# Patient Record
Sex: FEMALE | Race: WHITE | NOT HISPANIC OR LATINO | Employment: FULL TIME | URBAN - METROPOLITAN AREA
[De-identification: names, ages, dates, MRNs, and addresses within clinical notes are randomized per-mention and may not be internally consistent; named-entity substitution may affect disease eponyms.]

---

## 2017-10-11 ENCOUNTER — OFFICE VISIT (OUTPATIENT)
Dept: FAMILY MEDICINE CLINIC | Facility: CLINIC | Age: 22
End: 2017-10-11

## 2017-10-11 VITALS
SYSTOLIC BLOOD PRESSURE: 122 MMHG | HEART RATE: 83 BPM | DIASTOLIC BLOOD PRESSURE: 84 MMHG | BODY MASS INDEX: 36.8 KG/M2 | WEIGHT: 200 LBS | OXYGEN SATURATION: 100 % | HEIGHT: 62 IN

## 2017-10-11 DIAGNOSIS — Z23 NEED FOR VACCINATION: ICD-10-CM

## 2017-10-11 DIAGNOSIS — R19.7 DIARRHEA IN ADULT PATIENT: ICD-10-CM

## 2017-10-11 DIAGNOSIS — Z00.00 ANNUAL PHYSICAL EXAM: Primary | ICD-10-CM

## 2017-10-11 DIAGNOSIS — R56.9 SEIZURES (HCC): ICD-10-CM

## 2017-10-11 DIAGNOSIS — D50.8 OTHER IRON DEFICIENCY ANEMIA: ICD-10-CM

## 2017-10-11 DIAGNOSIS — Z00.00 PHYSICAL EXAM: ICD-10-CM

## 2017-10-11 LAB
ALBUMIN SERPL-MCNC: 4.5 G/DL (ref 3.5–5.2)
ALBUMIN/GLOB SERPL: 1.6 G/DL
ALP SERPL-CCNC: 57 U/L (ref 39–117)
ALT SERPL-CCNC: 22 U/L (ref 1–33)
AST SERPL-CCNC: 16 U/L (ref 1–32)
BASOPHILS # BLD AUTO: 0.01 10*3/MM3 (ref 0–0.2)
BASOPHILS NFR BLD AUTO: 0.1 % (ref 0–1.5)
BILIRUB SERPL-MCNC: 0.2 MG/DL (ref 0.1–1.2)
BUN SERPL-MCNC: 9 MG/DL (ref 6–20)
BUN/CREAT SERPL: 12.3 (ref 7–25)
CALCIUM SERPL-MCNC: 9.6 MG/DL (ref 8.6–10.5)
CHLORIDE SERPL-SCNC: 106 MMOL/L (ref 98–107)
CHOLEST SERPL-MCNC: 169 MG/DL (ref 0–200)
CHOLEST/HDLC SERPL: 3.25 {RATIO}
CO2 SERPL-SCNC: 21.9 MMOL/L (ref 22–29)
CREAT SERPL-MCNC: 0.73 MG/DL (ref 0.57–1)
DIFFERENTIAL COMMENT: NORMAL
EOSINOPHIL # BLD AUTO: 0.08 10*3/MM3 (ref 0–0.7)
EOSINOPHIL NFR BLD AUTO: 1.1 % (ref 0.3–6.2)
ERYTHROCYTE [DISTWIDTH] IN BLOOD BY AUTOMATED COUNT: 15.2 % (ref 11.7–13)
GLOBULIN SER CALC-MCNC: 2.8 GM/DL
GLUCOSE SERPL-MCNC: 87 MG/DL (ref 65–99)
HCT VFR BLD AUTO: 36.6 % (ref 35.6–45.5)
HDLC SERPL-MCNC: 52 MG/DL (ref 40–60)
HGB BLD-MCNC: 11.8 G/DL (ref 11.9–15.5)
IMM GRANULOCYTES # BLD: 0 10*3/MM3 (ref 0–0.03)
IMM GRANULOCYTES NFR BLD: 0 % (ref 0–0.5)
LDLC SERPL CALC-MCNC: 102 MG/DL (ref 0–100)
LYMPHOCYTES # BLD AUTO: 2.03 10*3/MM3 (ref 0.9–4.8)
LYMPHOCYTES NFR BLD AUTO: 28.6 % (ref 19.6–45.3)
MCH RBC QN AUTO: 22.8 PG (ref 26.9–32)
MCHC RBC AUTO-ENTMCNC: 32.2 G/DL (ref 32.4–36.3)
MCV RBC AUTO: 70.8 FL (ref 80.5–98.2)
MONOCYTES # BLD AUTO: 0.46 10*3/MM3 (ref 0.2–1.2)
MONOCYTES NFR BLD AUTO: 6.5 % (ref 5–12)
NEUTROPHILS # BLD AUTO: 4.52 10*3/MM3 (ref 1.9–8.1)
NEUTROPHILS NFR BLD AUTO: 63.7 % (ref 42.7–76)
PLATELET # BLD AUTO: 294 10*3/MM3 (ref 140–500)
PLATELET BLD QL SMEAR: NORMAL
POTASSIUM SERPL-SCNC: 4.1 MMOL/L (ref 3.5–5.2)
PROT SERPL-MCNC: 7.3 G/DL (ref 6–8.5)
RBC # BLD AUTO: 5.17 10*6/MM3 (ref 3.9–5.2)
RBC MORPH BLD: NORMAL
SODIUM SERPL-SCNC: 142 MMOL/L (ref 136–145)
TRIGL SERPL-MCNC: 77 MG/DL (ref 0–150)
VLDLC SERPL CALC-MCNC: 15.4 MG/DL (ref 5–40)
WBC # BLD AUTO: 7.1 10*3/MM3 (ref 4.5–10.7)

## 2017-10-11 PROCEDURE — 90471 IMMUNIZATION ADMIN: CPT | Performed by: NURSE PRACTITIONER

## 2017-10-11 PROCEDURE — 90686 IIV4 VACC NO PRSV 0.5 ML IM: CPT | Performed by: NURSE PRACTITIONER

## 2017-10-11 PROCEDURE — 99213 OFFICE O/P EST LOW 20 MIN: CPT | Performed by: NURSE PRACTITIONER

## 2017-10-11 PROCEDURE — 99395 PREV VISIT EST AGE 18-39: CPT | Performed by: NURSE PRACTITIONER

## 2017-10-11 RX ORDER — TOPIRAMATE 50 MG/1
50 TABLET, FILM COATED ORAL NIGHTLY
Qty: 30 TABLET | Refills: 11 | Status: SHIPPED | OUTPATIENT
Start: 2017-10-11 | End: 2017-12-05 | Stop reason: SDUPTHER

## 2017-10-11 NOTE — PATIENT INSTRUCTIONS
Preventive Care 18-39 Years, Female  Preventive care refers to lifestyle choices and visits with your health care provider that can promote health and wellness.  WHAT DOES PREVENTIVE CARE INCLUDE?  · A yearly physical exam. This is also called an annual well check.  · Dental exams once or twice a year.  · Routine eye exams. Ask your health care provider how often you should have your eyes checked.  · Personal lifestyle choices, including:    Daily care of your teeth and gums.    Regular physical activity.    Eating a healthy diet.    Avoiding tobacco and drug use.    Limiting alcohol use.    Practicing safe sex.    Taking vitamin and mineral supplements as recommended by your health care provider.  WHAT HAPPENS DURING AN ANNUAL WELL CHECK?  The services and screenings done by your health care provider during your annual well check will depend on your age, overall health, lifestyle risk factors, and family history of disease.  Counseling  Your health care provider may ask you questions about your:  · Alcohol use.  · Tobacco use.  · Drug use.  · Emotional well-being.  · Home and relationship well-being.  · Sexual activity.  · Eating habits.  · Work and work environment.  · Method of birth control.  · Menstrual cycle.  · Pregnancy history.  Screening  You may have the following tests or measurements:  · Height, weight, and BMI.  · Diabetes screening. This is done by checking your blood sugar (glucose) after you have not eaten for a while (fasting).  · Blood pressure.  · Lipid and cholesterol levels. These may be checked every 5 years starting at age 20.  · Skin check.  · Hepatitis C blood test.  · Hepatitis B blood test.  · Sexually transmitted disease (STD) testing.  · BRCA-related cancer screening. This may be done if you have a family history of breast, ovarian, tubal, or peritoneal cancers.  · Pelvic exam and Pap test. This may be done every 3 years starting at age 21. Starting at age 30, this may be done every 5  years if you have a Pap test in combination with an HPV test.  Discuss your test results, treatment options, and if necessary, the need for more tests with your health care provider.  Vaccines  Your health care provider may recommend certain vaccines, such as:  · Influenza vaccine. This is recommended every year.  · Tetanus, diphtheria, and acellular pertussis (Tdap, Td) vaccine. You may need a Td booster every 10 years.  · Varicella vaccine. You may need this if you have not been vaccinated.  · HPV vaccine. If you are 26 or younger, you may need three doses over 6 months.  · Measles, mumps, and rubella (MMR) vaccine. You may need at least one dose of MMR. You may also need a second dose.  · Pneumococcal 13-valent conjugate (PCV13) vaccine. You may need this if you have certain conditions and were not previously vaccinated.  · Pneumococcal polysaccharide (PPSV23) vaccine. You may need one or two doses if you smoke cigarettes or if you have certain conditions.  · Meningococcal vaccine. One dose is recommended if you are age 19-21 years and a first-year college student living in a residence yeager, or if you have one of several medical conditions. You may also need additional booster doses.  · Hepatitis A vaccine. You may need this if you have certain conditions or if you travel or work in places where you may be exposed to hepatitis A.  · Hepatitis B vaccine. You may need this if you have certain conditions or if you travel or work in places where you may be exposed to hepatitis B.  · Haemophilus influenzae type b (Hib) vaccine. You may need this if you have certain risk factors.  Talk to your health care provider about which screenings and vaccines you need and how often you need them.     This information is not intended to replace advice given to you by your health care provider. Make sure you discuss any questions you have with your health care provider.     Document Released: 02/13/2003 Document Revised:  04/10/2017 Document Reviewed: 10/18/2016  Elsevier Interactive Patient Education ©2017 [employer] Inc.

## 2017-10-11 NOTE — PROGRESS NOTES
Tangela Rodarte is a 22 y.o. female.  Seen 10/11/2017    Assessment/Plan   Problem List Items Addressed This Visit     None      Visit Diagnoses     Annual physical exam    -  Primary    Relevant Orders    Comprehensive Metabolic Panel    Lipid Panel With / Chol / HDL Ratio    Need for vaccination        Relevant Orders    Flu Vaccine Quad PF 3YR+ (FLUARIX 0814-3028)    Seizures        Relevant Medications    topiramate (TOPAMAX) 50 MG tablet    Other iron deficiency anemia        Relevant Orders    CBC Auto Differential    Physical exam        Diarrhea in adult patient        Relevant Orders    Ambulatory Referral to Gastroenterology             No Follow-up on file.  Patient Instructions   Preventive Care 18-39 Years, Female  Preventive care refers to lifestyle choices and visits with your health care provider that can promote health and wellness.  WHAT DOES PREVENTIVE CARE INCLUDE?  · A yearly physical exam. This is also called an annual well check.  · Dental exams once or twice a year.  · Routine eye exams. Ask your health care provider how often you should have your eyes checked.  · Personal lifestyle choices, including:    Daily care of your teeth and gums.    Regular physical activity.    Eating a healthy diet.    Avoiding tobacco and drug use.    Limiting alcohol use.    Practicing safe sex.    Taking vitamin and mineral supplements as recommended by your health care provider.  WHAT HAPPENS DURING AN ANNUAL WELL CHECK?  The services and screenings done by your health care provider during your annual well check will depend on your age, overall health, lifestyle risk factors, and family history of disease.  Counseling  Your health care provider may ask you questions about your:  · Alcohol use.  · Tobacco use.  · Drug use.  · Emotional well-being.  · Home and relationship well-being.  · Sexual activity.  · Eating habits.  · Work and work environment.  · Method of birth control.  · Menstrual cycle.  · Pregnancy  history.  Screening  You may have the following tests or measurements:  · Height, weight, and BMI.  · Diabetes screening. This is done by checking your blood sugar (glucose) after you have not eaten for a while (fasting).  · Blood pressure.  · Lipid and cholesterol levels. These may be checked every 5 years starting at age 20.  · Skin check.  · Hepatitis C blood test.  · Hepatitis B blood test.  · Sexually transmitted disease (STD) testing.  · BRCA-related cancer screening. This may be done if you have a family history of breast, ovarian, tubal, or peritoneal cancers.  · Pelvic exam and Pap test. This may be done every 3 years starting at age 21. Starting at age 30, this may be done every 5 years if you have a Pap test in combination with an HPV test.  Discuss your test results, treatment options, and if necessary, the need for more tests with your health care provider.  Vaccines  Your health care provider may recommend certain vaccines, such as:  · Influenza vaccine. This is recommended every year.  · Tetanus, diphtheria, and acellular pertussis (Tdap, Td) vaccine. You may need a Td booster every 10 years.  · Varicella vaccine. You may need this if you have not been vaccinated.  · HPV vaccine. If you are 26 or younger, you may need three doses over 6 months.  · Measles, mumps, and rubella (MMR) vaccine. You may need at least one dose of MMR. You may also need a second dose.  · Pneumococcal 13-valent conjugate (PCV13) vaccine. You may need this if you have certain conditions and were not previously vaccinated.  · Pneumococcal polysaccharide (PPSV23) vaccine. You may need one or two doses if you smoke cigarettes or if you have certain conditions.  · Meningococcal vaccine. One dose is recommended if you are age 19-21 years and a first-year college student living in a residence yeager, or if you have one of several medical conditions. You may also need additional booster doses.  · Hepatitis A vaccine. You may need this  if you have certain conditions or if you travel or work in places where you may be exposed to hepatitis A.  · Hepatitis B vaccine. You may need this if you have certain conditions or if you travel or work in places where you may be exposed to hepatitis B.  · Haemophilus influenzae type b (Hib) vaccine. You may need this if you have certain risk factors.  Talk to your health care provider about which screenings and vaccines you need and how often you need them.     This information is not intended to replace advice given to you by your health care provider. Make sure you discuss any questions you have with your health care provider.     Document Released: 02/13/2003 Document Revised: 04/10/2017 Document Reviewed: 10/18/2016  [employer] Interactive Patient Education ©2017 [employer]        Subjective   Here for her annual exam.  [facility].  H/O seborrhea and psoriasis and is followed by derm and stable on meds  H/o seizures and is followed by neurology and stable on her topimax.  H/O iron deficiency anemia and takes an iron supplement.  Has had some GI issues that started to get bad about 6 months ago. Has episodes where she eats and then will immediately have some diarrhea.  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 degree in economics  Chief Complaint   Patient presents with   • Annual Exam     Social History   Substance Use Topics   • Smoking status: Never Smoker   • Smokeless tobacco: Never Used   • Alcohol use Yes      Comment: SOCIAL       History of Present Illness     The following portions of the patient's history were reviewed and updated as appropriate:PMHroutine: Social history , Past Medical History, Surgical history , Allergies, Current Medications, Active Problem List, Family History and Health Maintenance    Review of Systems   Constitutional: Negative for activity change, appetite change, chills, fatigue, fever and unexpected weight change.   HENT: Negative for  "sore throat.    Respiratory: Negative for cough and wheezing.    Cardiovascular: Negative for leg swelling.   Gastrointestinal: Positive for diarrhea. Negative for constipation.   Skin: Negative for rash and wound.   Neurological: Negative for dizziness, seizures, weakness, numbness and headaches.   Psychiatric/Behavioral: Negative for dysphoric mood.       Objective   Vitals:    10/11/17 1313   BP: 122/84   Pulse: 83   SpO2: 100%   Weight: 200 lb (90.7 kg)   Height: 62\" (157.5 cm)     Body mass index is 36.58 kg/(m^2).  Physical Exam   Constitutional: She appears well-developed and well-nourished. No distress.   HENT:   Head: Normocephalic and atraumatic.   Right Ear: External ear normal.   Left Ear: External ear normal.   Eyes: EOM are normal.   Neck: Neck supple. No thyromegaly present.   Cardiovascular: Normal rate, regular rhythm and normal heart sounds.    Pulmonary/Chest: Effort normal and breath sounds normal.   Musculoskeletal: Normal range of motion.   Neurological: She is alert.   Skin: Skin is warm.   Thickened great toe nail to left foot   Nursing note and vitals reviewed.    Reviewed Data:  No visits with results within 1 Month(s) from this visit.  Latest known visit with results is:    Orders Only on 03/07/2016   Component Date Value Ref Range Status   • TIBC 03/11/2016 448  mcg/dL Final   • UIBC 03/11/2016 340  mcg/dL Final   • Iron 03/11/2016 108  37 - 145 mcg/dL Final   • Iron Saturation 03/11/2016 24  20 - 50 % Final   • Ferritin 03/11/2016 43.8  13.0 - 150.0 ng/mL Final         "

## 2017-11-02 RX ORDER — TOPIRAMATE 50 MG/1
TABLET, FILM COATED ORAL
Qty: 30 TABLET | Refills: 0 | Status: SHIPPED | OUTPATIENT
Start: 2017-11-02 | End: 2017-11-15

## 2017-11-15 ENCOUNTER — OFFICE VISIT (OUTPATIENT)
Dept: GASTROENTEROLOGY | Facility: CLINIC | Age: 22
End: 2017-11-15

## 2017-11-15 VITALS
SYSTOLIC BLOOD PRESSURE: 122 MMHG | WEIGHT: 199 LBS | HEIGHT: 62 IN | BODY MASS INDEX: 36.62 KG/M2 | DIASTOLIC BLOOD PRESSURE: 84 MMHG

## 2017-11-15 DIAGNOSIS — R19.7 DIARRHEA, UNSPECIFIED TYPE: Primary | ICD-10-CM

## 2017-11-15 PROCEDURE — 99203 OFFICE O/P NEW LOW 30 MIN: CPT | Performed by: INTERNAL MEDICINE

## 2017-11-15 NOTE — PROGRESS NOTES
"Chief Complaint   Patient presents with   • Diarrhea     off and on 6 mth- 1 year.   • Abdominal Pain     feels like gas pains, but never releases.        Tangela Rodarte is a  22 y.o. female here for an initial visit for Diarrhea    HPI This 22-year-old white female patient of Mable ZUNIGA presents with intermittent episodes of diarrhea.  She recounts problems dating back to middle school but has noted more frequent episodes over the past 6 months.  She may have several days of loose bowel movements described as 60-70% liquid and 30% solid.  These episodes may be followed by a normal formed bowel movement on a daily basis for up to 2 weeks at a time.  She notes symptoms are worse in the postprandial setting usually after her evening meal but not specific to the diet.  She does have a family history, a grandfather experienced gluten enteropathy.  Family history is negative for colorectal cancer.  Her symptoms are accompanied by a sense of urgency and lower abdominal cramping usually 30-60 minutes after her meal.  Usually this will stimulate her to have a bowel movement within 10 minutes of eating.  She has not had any nocturnal events.  Weight has decreased approximately 13 pounds but she notes this as volitional.  She's had rare reflux issues not requiring medication.  She does admit to \"mild anemia\" and has recently been initiated on iron therapy.  We discussed assessment to include stool studies, celiac panel, and possible endoscopic evaluation.    Past Medical History:   Diagnosis Date   • History of anemia as a child    • History of heart murmur in childhood    • Psoriasis    • Seborrhea    • Seizures        Current Outpatient Prescriptions   Medication Sig Dispense Refill   • clobetasol propionate (CLOBEX) 0.05 % shampoo      • Ferrous Sulfate (IRON) 28 MG tablet Take  by mouth.     • topiramate (TOPAMAX) 50 MG tablet Take 1 tablet by mouth Every Night. 30 tablet 11   • TRINESSA, 28, 0.18/0.215/0.25 " MG-35 MCG per tablet        No current facility-administered medications for this visit.        PRN Meds:.    No Known Allergies    Social History     Social History   • Marital status: Single     Spouse name: N/A   • Number of children: N/A   • Years of education: N/A     Occupational History   • Not on file.     Social History Main Topics   • Smoking status: Never Smoker   • Smokeless tobacco: Never Used   • Alcohol use Yes      Comment: SOCIAL   • Drug use: No   • Sexual activity: Not on file     Other Topics Concern   • Not on file     Social History Narrative       Family History   Problem Relation Age of Onset   • Diabetes Father    • Hypertension Father    • Cancer Father    • Kidney disease Father    • Diabetes Paternal Grandmother    • Arthritis Paternal Grandmother    • Stroke Paternal Grandfather    • Diabetes Paternal Grandfather        Review of Systems   Constitutional: Negative for activity change, appetite change, fatigue and unexpected weight change.   HENT: Negative for congestion, facial swelling, sore throat, trouble swallowing and voice change.    Eyes: Negative for photophobia and visual disturbance.   Respiratory: Negative for cough and choking.    Cardiovascular: Negative for chest pain.   Gastrointestinal: Positive for abdominal pain and diarrhea. Negative for abdominal distention, anal bleeding, blood in stool, constipation, nausea, rectal pain and vomiting.   Endocrine: Negative for polyphagia.   Musculoskeletal: Negative for arthralgias, gait problem and joint swelling.   Skin: Negative for color change, pallor and rash.   Allergic/Immunologic: Negative for food allergies.   Neurological: Negative for speech difficulty and headaches.   Hematological: Does not bruise/bleed easily.   Psychiatric/Behavioral: Negative for agitation, confusion and sleep disturbance.       Vitals:    11/15/17 1022   BP: 122/84       Physical Exam   Constitutional: She is oriented to person, place, and time. She  appears well-developed and well-nourished. No distress.   HENT:   Head: Normocephalic.   Mouth/Throat: Oropharynx is clear and moist. No oropharyngeal exudate.   Eyes: Conjunctivae and EOM are normal. No scleral icterus.   Neck: Normal range of motion. No thyromegaly present.   Cardiovascular: Normal rate and regular rhythm.    No murmur heard.  Pulmonary/Chest: Breath sounds normal. No respiratory distress. She has no wheezes. She has no rales.   Abdominal: Soft. Bowel sounds are normal. She exhibits no distension and no mass. There is no hepatosplenomegaly. There is no tenderness.   Musculoskeletal: Normal range of motion. She exhibits no edema or tenderness.   Lymphadenopathy:     She has no cervical adenopathy.   Neurological: She is alert and oriented to person, place, and time.   Skin: Skin is warm and dry. No rash noted. She is not diaphoretic. No erythema.   Psychiatric: She has a normal mood and affect. Her behavior is normal.   Vitals reviewed.      ASSESSMENT   #1 diarrhea: Chronic intermittent process.  Not clear as to specific association with diet, possible mild malabsorption component.      PLAN  Stool studies for fecal leukocytes and qualitative fecal fat  Celiac panel  Review of recent labs  Pending lab results we'll consider more formal evaluation  We'll call patient with results      ICD-10-CM ICD-9-CM   1. Diarrhea, unspecified type R19.7 787.91

## 2017-11-16 LAB
ENDOMYSIUM IGA SER QL: NEGATIVE
GLIADIN PEPTIDE IGA SER-ACNC: 4 UNITS (ref 0–19)
GLIADIN PEPTIDE IGG SER-ACNC: 5 UNITS (ref 0–19)
IGA SERPL-MCNC: 93 MG/DL (ref 87–352)
TTG IGA SER-ACNC: <2 U/ML (ref 0–3)
TTG IGG SER-ACNC: <2 U/ML (ref 0–5)

## 2017-11-20 LAB
FAT STL QL: NORMAL
NEUTRAL FAT STL QL: NORMAL
NTI FECAL TRPT (PP ORANGE): NORMAL
WBC STL QL MICRO: NORMAL
WBC STL QL MICRO: NORMAL

## 2017-12-05 DIAGNOSIS — R56.9 SEIZURES (HCC): ICD-10-CM

## 2017-12-05 RX ORDER — TOPIRAMATE 50 MG/1
50 TABLET, FILM COATED ORAL NIGHTLY
Qty: 30 TABLET | Refills: 11 | Status: SHIPPED | OUTPATIENT
Start: 2017-12-05 | End: 2018-03-02 | Stop reason: SDUPTHER

## 2017-12-05 RX ORDER — TOPIRAMATE 50 MG/1
TABLET, FILM COATED ORAL
Qty: 30 TABLET | Refills: 0 | OUTPATIENT
Start: 2017-12-05

## 2017-12-22 ENCOUNTER — TELEPHONE (OUTPATIENT)
Dept: GASTROENTEROLOGY | Facility: CLINIC | Age: 22
End: 2017-12-22

## 2017-12-22 NOTE — TELEPHONE ENCOUNTER
----- Message from Samantha Guerrero sent at 12/22/2017 11:19 AM EST -----  Regarding: stool results   Contact: [phone]  Pt called for stool report from a week ago

## 2017-12-22 NOTE — TELEPHONE ENCOUNTER
----- Message from Jose Guadalupe ARRINGTON MD sent at 11/16/2017  4:58 PM EST -----  Regarding: Celiac panel results  Okay to call results, essentially negative.  Awaiting stool study results.  ----- Message -----     From: [place], Reflab Results In     Sent: 11/16/2017   4:16 PM       To: Jose Guadalupe ARRINGTON MD

## 2017-12-22 NOTE — TELEPHONE ENCOUNTER
Call returned to pt.  Advise per DR Mcclain that essentially normal stool studies and celiac panel.  Other labs reviewed, which were also WNL.  If symptoms persist could offer formal eval ie: c/s.    Pt verb understanding and states will think about this, and call back as needed.

## 2017-12-22 NOTE — TELEPHONE ENCOUNTER
----- Message from Jose Guadalupe ARRINGTON MD sent at 11/21/2017  2:44 PM EST -----  Regarding: Stool studies, lab results  Okay to call results, essentially normal stool studies and celiac panel.  Have reviewed other labs which were also within normal limits.  If symptoms persist could offer formal evaluation i.e. colonoscopy.  ----- Message -----     From: Interface, Reflab Results In     Sent: 11/20/2017   8:08 PM       To: Jose Guadalupe ARRINGTON MD

## 2018-03-02 ENCOUNTER — OFFICE VISIT (OUTPATIENT)
Dept: NEUROLOGY | Facility: CLINIC | Age: 23
End: 2018-03-02

## 2018-03-02 VITALS
BODY MASS INDEX: 35.88 KG/M2 | HEIGHT: 62 IN | DIASTOLIC BLOOD PRESSURE: 80 MMHG | SYSTOLIC BLOOD PRESSURE: 105 MMHG | WEIGHT: 195 LBS

## 2018-03-02 DIAGNOSIS — R56.9 SEIZURES (HCC): ICD-10-CM

## 2018-03-02 DIAGNOSIS — R56.9 GENERALIZED CONVULSIVE SEIZURES (HCC): Primary | ICD-10-CM

## 2018-03-02 PROCEDURE — 99213 OFFICE O/P EST LOW 20 MIN: CPT | Performed by: PSYCHIATRY & NEUROLOGY

## 2018-03-02 RX ORDER — TOPIRAMATE 25 MG/1
50 TABLET ORAL NIGHTLY
Qty: 60 TABLET | Refills: 11 | Status: SHIPPED | OUTPATIENT
Start: 2018-03-02 | End: 2019-02-23 | Stop reason: SDUPTHER

## 2018-03-02 RX ORDER — NORETHINDRONE AND ETHINYL ESTRADIOL AND FERROUS FUMARATE 0.8-25(24)
KIT ORAL
COMMUNITY
Start: 2017-12-19

## 2018-03-02 RX ORDER — LEVETIRACETAM 500 MG/1
500 TABLET, EXTENDED RELEASE ORAL NIGHTLY
Qty: 30 TABLET | Refills: 11 | Status: SHIPPED | OUTPATIENT
Start: 2018-03-02 | End: 2021-10-05 | Stop reason: SDUPTHER

## 2018-04-06 ENCOUNTER — TELEPHONE (OUTPATIENT)
Dept: NEUROLOGY | Facility: CLINIC | Age: 23
End: 2018-04-06

## 2018-04-06 NOTE — TELEPHONE ENCOUNTER
Please have patient decrease Topamax to 1 pill or 25 mg at night for one month and then stopped Topamax

## 2018-04-06 NOTE — TELEPHONE ENCOUNTER
----- Message from Lizbeth Coburn MA sent at 4/6/2018  1:07 PM EDT -----  Contact: [phone]  Patient called and stated Dr. Masters changed her medication a month ago and he told her to call in a month. She was not sure if he was going to make any changes, but she stated everything has been working well.

## 2019-02-20 ENCOUNTER — OFFICE VISIT (OUTPATIENT)
Dept: FAMILY MEDICINE CLINIC | Facility: CLINIC | Age: 24
End: 2019-02-20

## 2019-02-20 VITALS
HEIGHT: 62 IN | HEART RATE: 76 BPM | BODY MASS INDEX: 41.04 KG/M2 | SYSTOLIC BLOOD PRESSURE: 110 MMHG | WEIGHT: 223 LBS | OXYGEN SATURATION: 99 % | DIASTOLIC BLOOD PRESSURE: 72 MMHG

## 2019-02-20 DIAGNOSIS — Z23 NEED FOR VACCINATION: Primary | ICD-10-CM

## 2019-02-20 DIAGNOSIS — M25.562 ACUTE PAIN OF LEFT KNEE: ICD-10-CM

## 2019-02-20 DIAGNOSIS — M79.672 LEFT FOOT PAIN: ICD-10-CM

## 2019-02-20 DIAGNOSIS — R56.9 GENERALIZED CONVULSIVE SEIZURES (HCC): ICD-10-CM

## 2019-02-20 DIAGNOSIS — L21.9 SEBORRHEA: ICD-10-CM

## 2019-02-20 PROCEDURE — 90715 TDAP VACCINE 7 YRS/> IM: CPT | Performed by: NURSE PRACTITIONER

## 2019-02-20 PROCEDURE — 99214 OFFICE O/P EST MOD 30 MIN: CPT | Performed by: NURSE PRACTITIONER

## 2019-02-20 PROCEDURE — 90471 IMMUNIZATION ADMIN: CPT | Performed by: NURSE PRACTITIONER

## 2019-02-20 NOTE — PROGRESS NOTES
Tangela Rodarte is a 23 y.o. female.Tangela has left knee pain for about 2 months, pain has increased in the last few weeks. Pain is at rest. She feels swelling in the area. She denies instability. Using Ibuprofen with some relief. She also tried a knee brace. NKI.    Secondly, she has had left foot pain for a few weeks. NKI.    Lastly, She has used Clobetasol shampoo and drops for dermatitis and psoriasis of her scalp. She used to see a dermatologist, but is asking that we take over management.     Seeing a new neurologist for her seizures  Last seizure was 2 years ago.      Assessment/Plan   Problem List Items Addressed This Visit        Nervous and Auditory    Generalized convulsive seizures (CMS/HCC)    Relevant Orders    Comprehensive Metabolic Panel    CBC (No Diff)    Levetiracetam Level (Keppra)    Topiramate level       Musculoskeletal and Integument    Seborrhea      Other Visit Diagnoses     Need for vaccination    -  Primary    Relevant Orders    Tdap Vaccine Greater Than or Equal To 6yo IM (Completed)    Acute pain of left knee        Relevant Orders    Ambulatory Referral to Physical Therapy Evaluate and treat    Left foot pain        Relevant Orders    Ambulatory Referral to Physical Therapy Evaluate and treat             Return if symptoms worsen or fail to improve.  There are no Patient Instructions on file for this visit.    Chief Complaint   Patient presents with   • Knee Pain   • Foot Swelling   • Hair/Scalp Problem     Social History     Tobacco Use   • Smoking status: Never Smoker   • Smokeless tobacco: Never Used   Substance Use Topics   • Alcohol use: Yes     Comment: SOCIAL   • Drug use: No       History of Present Illness     The following portions of the patient's history were reviewed and updated as appropriate:PMHroutine: Social history , Past Medical History, Allergies, Current Medications, Active Problem List and Health Maintenance    Review of Systems   Constitutional: Positive for  "activity change.   HENT: Negative for congestion.    Musculoskeletal: Positive for gait problem and joint swelling.   Skin: Positive for rash.       Objective   Vitals:    02/20/19 0733   BP: 110/72   Pulse: 76   SpO2: 99%   Weight: 101 kg (223 lb)   Height: 157.5 cm (62\")     Body mass index is 40.79 kg/m².  Physical Exam   Constitutional: She is oriented to person, place, and time. She appears well-developed and well-nourished. No distress.   HENT:   Head: Normocephalic and atraumatic.   Pulmonary/Chest: Effort normal.   Musculoskeletal: Normal range of motion.   Pain to left knee at the   And left foot pain   Neurological: She is alert and oriented to person, place, and time.   Skin: Skin is warm.   Nursing note and vitals reviewed.    Reviewed Data:  No visits with results within 1 Month(s) from this visit.   Latest known visit with results is:   Orders Only on 11/17/2017   Component Date Value Ref Range Status   • Fecal Fat Qualitative, Neutral Fats 11/17/2017  Normal   Final                                   Normal (<60 Droplets/HPF)   • Fecal Fats, Qualititative, Total 11/17/2017  Normal   Final                                  Normal (<100 Droplets/HPF)   • Fecal Leukocytes 11/17/2017 Final report  None Seen Final   • Result 1 11/17/2017 Comment   Final    No white blood cells seen.   • NTI Fecal Trpt ([facility] [place]) 11/17/2017 Comment   Final    Comment: A stool culture transport was received with no test indicated. If  testing is required on this specimen, please contact [facility]  Client Inquiry/Technical Services Department to obtain a Request for  Written Authorization Form.           "

## 2019-02-21 LAB
ALBUMIN SERPL-MCNC: 4.1 G/DL (ref 3.5–5.5)
ALBUMIN/GLOB SERPL: 1.5 {RATIO} (ref 1.2–2.2)
ALP SERPL-CCNC: 55 IU/L (ref 39–117)
ALT SERPL-CCNC: 22 IU/L (ref 0–32)
AST SERPL-CCNC: 15 IU/L (ref 0–40)
BILIRUB SERPL-MCNC: <0.2 MG/DL (ref 0–1.2)
BUN SERPL-MCNC: 13 MG/DL (ref 6–20)
BUN/CREAT SERPL: 20 (ref 9–23)
CALCIUM SERPL-MCNC: 9 MG/DL (ref 8.7–10.2)
CHLORIDE SERPL-SCNC: 109 MMOL/L (ref 96–106)
CO2 SERPL-SCNC: 17 MMOL/L (ref 20–29)
CREAT SERPL-MCNC: 0.64 MG/DL (ref 0.57–1)
ERYTHROCYTE [DISTWIDTH] IN BLOOD BY AUTOMATED COUNT: 15.6 % (ref 12.3–15.4)
GLOBULIN SER CALC-MCNC: 2.7 G/DL (ref 1.5–4.5)
GLUCOSE SERPL-MCNC: 76 MG/DL (ref 65–99)
HCT VFR BLD AUTO: 35 % (ref 34–46.6)
HGB BLD-MCNC: 10.6 G/DL (ref 11.1–15.9)
MCH RBC QN AUTO: 21.5 PG (ref 26.6–33)
MCHC RBC AUTO-ENTMCNC: 30.3 G/DL (ref 31.5–35.7)
MCV RBC AUTO: 71 FL (ref 79–97)
PLATELET # BLD AUTO: 341 X10E3/UL (ref 150–379)
POTASSIUM SERPL-SCNC: 4.4 MMOL/L (ref 3.5–5.2)
PROT SERPL-MCNC: 6.8 G/DL (ref 6–8.5)
RBC # BLD AUTO: 4.93 X10E6/UL (ref 3.77–5.28)
SODIUM SERPL-SCNC: 141 MMOL/L (ref 134–144)
TOPIRAMATE SERPL-MCNC: 1.7 UG/ML (ref 2–25)
WBC # BLD AUTO: 7.8 X10E3/UL (ref 3.4–10.8)

## 2019-02-23 DIAGNOSIS — R56.9 SEIZURES (HCC): ICD-10-CM

## 2019-02-23 LAB — LEVETIRACETAM SERPL-MCNC: 12.1 UG/ML (ref 10–40)

## 2019-02-25 RX ORDER — TOPIRAMATE 25 MG/1
50 TABLET ORAL NIGHTLY
Qty: 180 TABLET | Refills: 0 | Status: SHIPPED | OUTPATIENT
Start: 2019-02-25 | End: 2020-02-25

## 2019-04-02 RX ORDER — CLOBETASOL PROPIONATE 0.05 G/100ML
SHAMPOO TOPICAL DAILY
Qty: 20 ML | Refills: 5 | Status: SHIPPED | OUTPATIENT
Start: 2019-04-02 | End: 2021-10-05 | Stop reason: SDUPTHER

## 2020-08-18 ENCOUNTER — OFFICE VISIT (OUTPATIENT)
Dept: FAMILY MEDICINE CLINIC | Facility: CLINIC | Age: 25
End: 2020-08-18

## 2020-08-18 VITALS
DIASTOLIC BLOOD PRESSURE: 70 MMHG | WEIGHT: 204 LBS | RESPIRATION RATE: 16 BRPM | TEMPERATURE: 97.2 F | BODY MASS INDEX: 37.54 KG/M2 | OXYGEN SATURATION: 99 % | HEIGHT: 62 IN | SYSTOLIC BLOOD PRESSURE: 114 MMHG | HEART RATE: 102 BPM

## 2020-08-18 DIAGNOSIS — M25.511 CHRONIC RIGHT SHOULDER PAIN: ICD-10-CM

## 2020-08-18 DIAGNOSIS — G89.29 CHRONIC RIGHT SHOULDER PAIN: ICD-10-CM

## 2020-08-18 DIAGNOSIS — B35.1 ONYCHOMYCOSIS: ICD-10-CM

## 2020-08-18 DIAGNOSIS — Z01.89 ROUTINE LAB DRAW: ICD-10-CM

## 2020-08-18 DIAGNOSIS — M94.20 CHONDROMALACIA: Primary | ICD-10-CM

## 2020-08-18 DIAGNOSIS — G40.309 NONINTRACTABLE GENERALIZED IDIOPATHIC EPILEPSY WITHOUT STATUS EPILEPTICUS (HCC): ICD-10-CM

## 2020-08-18 PROCEDURE — 99214 OFFICE O/P EST MOD 30 MIN: CPT | Performed by: NURSE PRACTITIONER

## 2020-08-18 RX ORDER — TOPIRAMATE 50 MG/1
50 TABLET, FILM COATED ORAL NIGHTLY
COMMUNITY
Start: 2020-05-23 | End: 2021-10-05 | Stop reason: SDUPTHER

## 2020-08-18 NOTE — PROGRESS NOTES
Subjective   Tangela Rodarte is a 24 y.o. female.   Knee Pain    History of Present Illness   Bilateral knee pain, she had PT and it has been helping.She walks daily.  Hurts when she walks up the stairs.  Also she started having some rt shoulder pain about 5 months ago. She denies any fall or injury. She has no repetitive motion. Ibuprofen helps a little  Has history of generalized seizures and is followed by neurology. Last seizure was over a year ago.  Lastly she has some fungus to her thumbs and her great toe nails.She did not try anything over the counter medicine.     The following portions of the patient's history were reviewed and updated as appropriate: allergies, current medications, past family history, past medical history, past social history, past surgical history and problem list.    Review of Systems   Constitutional: Positive for activity change. Negative for appetite change and fever.   Respiratory: Negative for cough and shortness of breath.    Cardiovascular: Negative for chest pain and leg swelling.   Musculoskeletal: Positive for gait problem.        Mild fungus to thumbs and great toe nails   Skin: Negative for rash.       Objective   Physical Exam   Constitutional: She appears well-developed and well-nourished. She appears distressed.   HENT:   Head: Normocephalic and atraumatic.   Eyes: Pupils are equal, round, and reactive to light. EOM are normal.   Pulmonary/Chest: Effort normal.   Musculoskeletal: Normal range of motion. She exhibits tenderness.   To bilateral knees the joint is stable  Rt shoulder pain has full range of motion   Skin: Skin is warm.   Nursing note and vitals reviewed.        Assessment/Plan   Problem List Items Addressed This Visit     None      Visit Diagnoses     Chondromalacia    -  Primary    Relevant Orders    Ambulatory Referral to Orthopedic Surgery    Chronic right shoulder pain        Routine lab draw        Relevant Orders    Comprehensive Metabolic Panel     Lipid Panel With LDL / HDL Ratio    Nonintractable generalized idiopathic epilepsy without status epilepticus (CMS/HCC)        Relevant Medications    topiramate (TOPAMAX) 25 MG tablet    Other Relevant Orders    Topiramate level    Onychomycosis            Will call with lab results.  She is going to try something otc for her nail fungus.  Referral to orthopedics         Return in about 6 months (around 2/18/2021).

## 2020-08-18 NOTE — PATIENT INSTRUCTIONS
Patellofemoral Pain Syndrome    Patellofemoral pain syndrome is a condition in which the tissue (cartilage) on the underside of the kneecap (patella) softens or breaks down. This causes pain in the front of the knee. The condition is also called runner's knee or chondromalacia patella. Patellofemoral pain syndrome is most common in young adults who are active in sports.  The knee is the largest joint in the body. The patella covers the front of the knee and is attached to muscles above and below the knee. The underside of the patella is covered with a smooth type of cartilage (synovium). The smooth surface helps the patella to glide easily when you move your knee. Patellofemoral pain syndrome causes swelling in the joint linings and bone surfaces in the knee.  What are the causes?  This condition may be caused by:  · Overuse of the knee.  · Poor alignment of your knee joints.  · Weak leg muscles.  · A direct blow to your kneecap.  What increases the risk?  You are more likely to develop this condition if:  · You do a lot of activities that can wear down your kneecap. These include:  ? Running.  ? Squatting.  ? Climbing stairs.  · You start a new physical activity or exercise program.  · You wear shoes that do not fit well.  · You do not have good leg strength.  · You are overweight.  What are the signs or symptoms?  The main symptom of this condition is knee pain. This may feel like a dull, aching pain underneath your patella, in the front of your knee. There may be a popping or cracking sound when you move your knee. Pain may get worse with:  · Exercise.  · Climbing stairs.  · Running.  · Jumping.  · Squatting.  · Kneeling.  · Sitting for a long time.  · Moving or pushing on your patella.  How is this diagnosed?  This condition may be diagnosed based on:  · Your symptoms and medical history. You may be asked about your recent physical activities and which ones cause knee pain.  · A physical exam. This may  include:  ? Moving your patella back and forth.  ? Checking your range of knee motion.  ? Having you squat or jump to see if you have pain.  ? Checking the strength of your leg muscles.  · Imaging tests to confirm the diagnosis. These may include an MRI of your knee.  How is this treated?  This condition may be treated at home with rest, ice, compression, and elevation (RICE).   Other treatments may include:  · Nonsteroidal anti-inflammatory drugs (NSAIDs).  · Physical therapy to stretch and strengthen your leg muscles.  · Shoe inserts (orthotics) to take stress off your knee.  · A knee brace or knee support.  · Adhesive tapes to the skin.  · Surgery to remove damaged cartilage or move the patella to a better position. This is rare.  Follow these instructions at home:  If you have a shoe or brace:  · Wear the shoe or brace as told by your health care provider. Remove it only as told by your health care provider.  · Loosen the shoe or brace if your toes tingle, become numb, or turn cold and blue.  · Keep the shoe or brace clean.  · If the shoe or brace is not waterproof:  ? Do not let it get wet.  ? Cover it with a watertight covering when you take a bath or a shower.  Managing pain, stiffness, and swelling  · If directed, put ice on the painful area.  ? If you have a removable shoe or brace, remove it as told by your health care provider.  ? Put ice in a plastic bag.  ? Place a towel between your skin and the bag.  ? Leave the ice on for 20 minutes, 2-3 times a day.  · Move your toes often to avoid stiffness and to lessen swelling.  · Rest your knee:  ? Avoid activities that cause knee pain.  ? When sitting or lying down, raise (elevate) the injured area above the level of your heart, whenever possible.  General instructions  · Take over-the-counter and prescription medicines only as told by your health care provider.  · Use splints, braces, knee supports, or walking aids as directed by your health care  provider.  · Perform stretching and strengthening exercises as told by your health care provider or physical therapist.  · Do not use any products that contain nicotine or tobacco, such as cigarettes and e-cigarettes. These can delay healing. If you need help quitting, ask your health care provider.  · Return to your normal activities as told by your health care provider. Ask your health care provider what activities are safe for you.  · Keep all follow-up visits as told by your health care provider. This is important.  Contact a health care provider if:  · Your symptoms get worse.  · You are not improving with home care.  Summary  · Patellofemoral pain syndrome is a condition in which the tissue (cartilage) on the underside of the kneecap (patella) softens or breaks down.  · This condition causes swelling in the joint linings and bone surfaces in the knee. This leads to pain in the front of the knee.  · This condition may be treated at home with rest, ice, compression, and elevation (RICE).  · Use splints, braces, knee supports, or walking aids as directed by your health care provider.  This information is not intended to replace advice given to you by your health care provider. Make sure you discuss any questions you have with your health care provider.  Document Released: 12/06/2010 Document Revised: 01/28/2019 Document Reviewed: 01/28/2019  [employer] Patient Education © 2020 [employer]

## 2020-08-19 LAB
ALBUMIN SERPL-MCNC: 4.2 G/DL (ref 3.5–5.2)
ALBUMIN/GLOB SERPL: 1.6 G/DL
ALP SERPL-CCNC: 57 U/L (ref 39–117)
ALT SERPL-CCNC: 22 U/L (ref 1–33)
AST SERPL-CCNC: 14 U/L (ref 1–32)
BILIRUB SERPL-MCNC: 0.3 MG/DL (ref 0–1.2)
BUN SERPL-MCNC: 9 MG/DL (ref 6–20)
BUN/CREAT SERPL: 16.1 (ref 7–25)
CALCIUM SERPL-MCNC: 9.1 MG/DL (ref 8.6–10.5)
CHLORIDE SERPL-SCNC: 108 MMOL/L (ref 98–107)
CHOLEST SERPL-MCNC: 197 MG/DL (ref 0–200)
CO2 SERPL-SCNC: 21.8 MMOL/L (ref 22–29)
CREAT SERPL-MCNC: 0.56 MG/DL (ref 0.57–1)
GLOBULIN SER CALC-MCNC: 2.7 GM/DL
GLUCOSE SERPL-MCNC: 88 MG/DL (ref 65–99)
HDLC SERPL-MCNC: 43 MG/DL (ref 40–60)
LDLC SERPL CALC-MCNC: 131 MG/DL (ref 0–100)
LDLC/HDLC SERPL: 3.05 {RATIO}
POTASSIUM SERPL-SCNC: 4.1 MMOL/L (ref 3.5–5.2)
PROT SERPL-MCNC: 6.9 G/DL (ref 6–8.5)
SODIUM SERPL-SCNC: 141 MMOL/L (ref 136–145)
TOPIRAMATE SERPL-MCNC: 1.9 UG/ML (ref 2–25)
TRIGL SERPL-MCNC: 115 MG/DL (ref 0–150)
VLDLC SERPL CALC-MCNC: 23 MG/DL (ref 5–40)

## 2020-08-25 ENCOUNTER — OFFICE VISIT (OUTPATIENT)
Dept: ORTHOPEDIC SURGERY | Facility: CLINIC | Age: 25
End: 2020-08-25

## 2020-08-25 VITALS
HEIGHT: 62 IN | WEIGHT: 203 LBS | BODY MASS INDEX: 37.36 KG/M2 | SYSTOLIC BLOOD PRESSURE: 115 MMHG | DIASTOLIC BLOOD PRESSURE: 82 MMHG | HEART RATE: 88 BPM

## 2020-08-25 DIAGNOSIS — G89.29 CHRONIC PAIN OF BOTH KNEES: Primary | ICD-10-CM

## 2020-08-25 DIAGNOSIS — M22.2X2 PATELLOFEMORAL PAIN SYNDROME OF BOTH KNEES: ICD-10-CM

## 2020-08-25 DIAGNOSIS — M25.561 CHRONIC PAIN OF BOTH KNEES: Primary | ICD-10-CM

## 2020-08-25 DIAGNOSIS — M22.2X1 PATELLOFEMORAL PAIN SYNDROME OF BOTH KNEES: ICD-10-CM

## 2020-08-25 DIAGNOSIS — M25.562 CHRONIC PAIN OF BOTH KNEES: Primary | ICD-10-CM

## 2020-08-25 PROCEDURE — 99203 OFFICE O/P NEW LOW 30 MIN: CPT | Performed by: FAMILY MEDICINE

## 2020-08-25 RX ORDER — MELOXICAM 15 MG/1
15 TABLET ORAL DAILY
Qty: 30 TABLET | Refills: 3 | Status: SHIPPED | OUTPATIENT
Start: 2020-08-25 | End: 2020-08-25

## 2020-08-25 RX ORDER — MELOXICAM 15 MG/1
15 TABLET ORAL DAILY
Qty: 90 TABLET | Refills: 0 | Status: SHIPPED | OUTPATIENT
Start: 2020-08-25 | End: 2020-11-11 | Stop reason: SDUPTHER

## 2020-08-25 NOTE — PROGRESS NOTES
"Primary Care Sports Medicine Office Visit Note     Patient ID: Tangela Rodarte is a 24 y.o. female.    Chief Complaint:  Chief Complaint   Patient presents with   • Left Knee - Initial Evaluation   • Right Knee - Initial Evaluation     HPI:    Ms. Tangela Rodarte is a 24 y.o. female who presents to the clinic today for bilateral knee pain. She was seen for L knee pain, and sent to PT in 2018 for chondromalacia. R knee started bothering her a few months ago. Pain with activity, stairs. L knee become stiff with excessive use, but R knee is painful. Occasional IBU, helps mildly. She has attempted warm water pool/exercise. Has attempted to continue at home exercise.     Past Medical History:   Diagnosis Date   • History of anemia as a child    • History of heart murmur in childhood    • Psoriasis    • Psoriasis    • Seborrhea    • Seizures (CMS/HCC)        Past Surgical History:   Procedure Laterality Date   • TONSILLECTOMY     • WISDOM TOOTH EXTRACTION         Family History   Problem Relation Age of Onset   • Diabetes Father    • Hypertension Father    • Cancer Father    • Kidney disease Father    • Diabetes Paternal Grandmother    • Arthritis Paternal Grandmother    • Stroke Paternal Grandfather    • Diabetes Paternal Grandfather      Social History     Occupational History   • Not on file   Tobacco Use   • Smoking status: Never Smoker   • Smokeless tobacco: Never Used   Substance and Sexual Activity   • Alcohol use: Yes     Comment: SOCIAL   • Drug use: No   • Sexual activity: Not on file      Review of Systems   Constitutional: Negative for activity change and fever.   Musculoskeletal: Positive for arthralgias.   Skin: Negative for color change and rash.   Neurological: Negative for weakness.     Objective:    /82 (BP Location: Left arm, Patient Position: Sitting, Cuff Size: Adult)   Pulse 88   Ht 157.5 cm (62\")   Wt 92.1 kg (203 lb)   LMP 08/11/2020   BMI 37.13 kg/m²     Physical " Examination:  Physical Exam   Constitutional: She appears well-developed and well-nourished. No distress.   HENT:   Head: Normocephalic and atraumatic.   Eyes: Conjunctivae are normal.   Cardiovascular: Intact distal pulses.   Pulmonary/Chest: Effort normal. No respiratory distress.   Musculoskeletal:        Right knee: She exhibits no effusion.        Left knee: She exhibits no effusion.   Neurological: She is alert.   Skin: Skin is warm. Capillary refill takes less than 2 seconds. She is not diaphoretic.   Nursing note and vitals reviewed.    Right Ankle Exam     Range of Motion   The patient has normal right ankle ROM.      Left Ankle Exam     Range of Motion   The patient has normal left ankle ROM.       Right Knee Exam     Muscle Strength   The patient has normal right knee strength.    Tenderness   The patient is experiencing tenderness in the lateral retinaculum.    Range of Motion   The patient has normal right knee ROM.  Extension: normal   Flexion: normal     Tests   Rohit:  Medial - negative Lateral - negative  Varus: negative Valgus: negative  Lachman:  Anterior - negative      Drawer:  Anterior - negative    Posterior - negative    Other   Erythema: absent  Sensation: normal (Distal to the knee. DP and PT palpable. )  Pulse: present  Swelling: none  Effusion: no effusion present    Comments:  Mild lateral patellar tilt, mild patellar grind with range of motion      Left Knee Exam     Muscle Strength   The patient has normal left knee strength.    Tenderness   The patient is experiencing tenderness in the lateral retinaculum.    Range of Motion   The patient has normal left knee ROM.  Extension: normal   Flexion: normal     Tests   Rohit:  Medial - negative Lateral - negative  Varus: negative Valgus: negative  Lachman:  Anterior - negative      Drawer:  Anterior - negative     Posterior - negative    Other   Erythema: absent  Effusion: no effusion present    Comments:  Mild lateral patellar tilt,  "mild patellar grind with range of motion      Right Hip Exam     Range of Motion   The patient has normal right hip ROM.      Left Hip Exam     Range of Motion   The patient has normal left hip ROM.          Imaging and other tests:  Three-view XR bilateral knees yields no obvious fracture, malalignment, or dislocation.    Assessment and Plan:    1. Chronic pain of both knees  - XR Knee 3 View Bilateral    2. Patellofemoral pain syndrome of both knees  - Ambulatory Referral to Physical Therapy Evaluate and treat; ROM (VMO strength), Stretching, Strengthening    Discussed muscular knee imbalances, and changes of patellar tracking with quadriceps weakness.  I would like for her to start physical therapy for stretching and strengthening, specifically VMO.  RTC in 2-3 months if no improvement.    Kenny LOMBARDI \"Chance\" Jalen CORONA DO, CAQSM  08/28/20  16:33    Disclaimer: Please note that areas of this note were completed with computer voice recognition software.  Quite often unanticipated grammatical, syntax, homophones, and other interpretive errors are inadvertently transcribed by the computer software. Please excuse any errors that have escaped final proofreading.  "

## 2020-09-02 ENCOUNTER — TREATMENT (OUTPATIENT)
Dept: PHYSICAL THERAPY | Facility: CLINIC | Age: 25
End: 2020-09-02

## 2020-09-02 DIAGNOSIS — M22.2X1 PATELLOFEMORAL PAIN SYNDROME OF RIGHT KNEE: ICD-10-CM

## 2020-09-02 DIAGNOSIS — M25.561 ACUTE PAIN OF RIGHT KNEE: Primary | ICD-10-CM

## 2020-09-02 PROCEDURE — 97110 THERAPEUTIC EXERCISES: CPT | Performed by: PHYSICAL THERAPIST

## 2020-09-02 PROCEDURE — 97161 PT EVAL LOW COMPLEX 20 MIN: CPT | Performed by: PHYSICAL THERAPIST

## 2020-09-02 NOTE — PROGRESS NOTES
"Physical Therapy Initial Evaluation and Plan of Care    Patient: Tangela Rodarte   : 1995  Diagnosis/ICD-10 Code:  Acute pain of right knee [M25.561]  Referring practitioner: Kenny Rao I*  Date of Initial Visit: 2020  Today's Date: 2020  Patient seen for 1 sessions           Subjective Questionnaire: ( Oxford Knee score = 29/50)       Subjective Evaluation    History of Present Illness  Mechanism of injury: Patient reports that the R knee pain started this past July wo an injury.  She also reports prior L knee pain last March; she did received PT @ [facility] x's 4 visits with a HEP   States the ex did not help; thus she stopped.  She was seen by ortho this last week with PT referral for PFS and started on myobic.   Since that time, the pain level has dropped from 7 to -5 of 10.   The left knee pain has not changed over the last 6 months prior to seeing ortho and starting the meds. Must use the sink counter to rise from the toilet. Left leg does give out on her wo notice, rogelio going down the stairs as \"it locks backwards\"   EX: from [facility], SLR flex/abd, clamshells and band hip abd.  Has a [employer] membership/not using right now due to Covid.  Post walking she does have susan lateral foot pain and left metatarsal pain.   CHIEF CO:  Pain with the stairs.   1ST AM  \"walks like Frankenstein\" due to the stiffness  WORSE  1st am and evening.  Stairs, going from sit <> stand and the 1st few steps.     BEST   Legs slraight out and slight bend in the knee.   SLEEP  PAST MEDICAL HISTORY:  Anemia from childhood.       Patient Occupation: currently working from home Quality of life: excellent    Pain  Current pain ratin  At best pain ratin  At worst pain ratin  Quality: dull ache and sharp (the pain is superior patella with a grinding sensation.   dull with walking.  )    Social Support  Lives in: apartment (lives on the second floor and must do steps)  Lives with: spouse    Hand dominance: " right    Diagnostic Tests  X-ray: normal    Treatments  Previous treatment: physical therapy  Current treatment: medication  Patient Goals  Patient goals for therapy: decreased pain             Objective          Observations     Additional Knee Observation Details  Genu valgus. Pes planus susan     Tenderness   Left Knee   Tenderness in the medial joint line.     Right Knee   Tenderness in the medial joint line.     Active Range of Motion   Left Knee   Normal active range of motion    Right Knee   Normal active range of motion    Patellar Mobility   Left Knee Hypermobile in the left medial, left lateral, left superior and left inferior patellar tendon(s).     Right Knee Hypermobile in the medial, lateral, superior and inferior patellar tendon(s).     Strength/Myotome Testing     Left Knee   Flexion: 4  Extension: 4-  Quadriceps contraction: good    Right Knee   Flexion: 4  Extension: 4-  Quadriceps contraction: good    Tests     Left Knee   Positive patella-femoral grind.     Right Knee   Positive patellar compression.           Assessment & Plan     Assessment  Impairments: abnormal gait, abnormal or restricted ROM, activity intolerance, impaired physical strength, lacks appropriate home exercise program and pain with function  Assessment details: Mrs Rodarte is a 26 yr old female referred to PT due to a recent onset of R knee pain and ongoing L knee pain.  The initial PT evaluation was completed today with evidence consistent with bilateral PFS with associated weak quad/VMO/hip ER, pes planus.   It is noted that she did have some L knee pain with Rohit and Thessaly tests which are suggestive of meniscus involvement.  She would benefit from outpatient PT in order to achieve the stated goals and maximize her functional mobility.   Prognosis: good  Functional Limitations: walking and uncomfortable because of pain  Goals  Plan Goals: ST visits     1)  Pain levels 0-5/10     2)  Patient to report a 30%  improvement in her ability to stand from prolonged sitting and going up the steps.     3)  No reports of the L knee giving out.     LTG   DC     1)  No knee pain at rest and/or with activity     2)  (-) special tests     3)  IHEP     4)  Patient to complete all home activities wo pain, including ascend/descend steps and sit <> stand wo pain.     5)  Improve the Oxford functional knee score =/> 10 points.     Plan  Therapy options: will be seen for skilled physical therapy services  Planned modality interventions: cryotherapy, high voltage pulsed current (pain management), ultrasound and thermotherapy (hydrocollator packs)  Planned therapy interventions: joint mobilization, home exercise program, flexibility, functional ROM exercises, balance/weight-bearing training, manual therapy, neuromuscular re-education, soft tissue mobilization, strengthening, stretching and therapeutic activities  Frequency: 2x week  Duration in visits: 20  Treatment plan discussed with: patient        Timed:         Manual Therapy:         mins  58759;     Therapeutic Exercise:    24     mins  04983;     Neuromuscular Cristiano:        mins  22575;    Therapeutic Activity:          mins  04124;     Gait Training:           mins  42640;     Ultrasound:          mins  99041;    Ionto                                   mins   34748  Self Care                       3     mins   10445  Canalith Repos         mins 51334      Un-Timed:  Electrical Stimulation:         mins  07869 ( );  Dry Needling          mins self-pay  Traction          mins 08339  Low Eval     29     Mins  88901  Mod Eval          Mins  41952  High Eval                            Mins  90104  Re-Eval                               mins  65209        Timed Treatment:   27   mins   Total Treatment:     56   mins    PT SIGNATURE: Shayy Oseguera, AMAURY   DATE TREATMENT INITIATED: 9/2/2020    Initial Certification  Certification Period: 12/1/2020  I certify that the therapy services  are furnished while this patient is under my care.  The services outlined above are required by this patient, and will be reviewed every 90 days.     PHYSICIAN: Kenny Rao II, DO      DATE:     Please sign and return via fax to [phone].. Thank you, [facility].

## 2020-09-23 ENCOUNTER — TREATMENT (OUTPATIENT)
Dept: PHYSICAL THERAPY | Facility: CLINIC | Age: 25
End: 2020-09-23

## 2020-09-23 DIAGNOSIS — M22.2X1 PATELLOFEMORAL PAIN SYNDROME OF RIGHT KNEE: ICD-10-CM

## 2020-09-23 DIAGNOSIS — M25.561 ACUTE PAIN OF RIGHT KNEE: Primary | ICD-10-CM

## 2020-09-23 PROCEDURE — 97530 THERAPEUTIC ACTIVITIES: CPT | Performed by: PHYSICAL THERAPIST

## 2020-09-23 PROCEDURE — 97110 THERAPEUTIC EXERCISES: CPT | Performed by: PHYSICAL THERAPIST

## 2020-09-23 NOTE — PROGRESS NOTES
Physical Therapy Daily Progress Note    VISIT#: 2    Subjective   Tangela Rodarte reports that she has been doing the ex at home.  States the right is worse than L.  Tape did help       Objective   Active R knee flexion with moderate crepitus; mild and occasional crepitus during L knee flexion.  No change of pain with resistance.   Post K tape application, no crepitus during knee extension.  Very mild ITB limitations  Closed chain strengthening  See Exercise, Manual, and Modality Logs for complete treatment.     Patient Education:    Assessment/Plan  Excellent tolerance to the current ex program.  Quads are definitively weaker than hamstrings and VMO weaker than lateral quad.     Progress per Plan of Care            Timed:         Manual Therapy:     3    mins  07836;     Therapeutic Exercise:    35     mins  45052;     Neuromuscular Cristiano:        mins  47159;    Therapeutic Activity:     12     mins  80113;     Gait Training:           mins  44930;     Ultrasound:          mins  85351;    Ionto                                   mins   85051  Self Care                            mins   57563  Canalith Repos                   mins  14022    Un-Timed:  Electrical Stimulation:         mins  99714 ( );  Dry Needling          mins self-pay  Traction          mins 69485  Low Eval          Mins  84884  Mod Eval          Mins  82561  High Eval                            Mins  09064  Re-Eval                               mins  68013    Timed Treatment:   50   mins   Total Treatment:     50   mins    Shayy Oseguera PT    Physical Therapist

## 2020-09-29 ENCOUNTER — TREATMENT (OUTPATIENT)
Dept: PHYSICAL THERAPY | Facility: CLINIC | Age: 25
End: 2020-09-29

## 2020-09-29 DIAGNOSIS — M22.2X1 PATELLOFEMORAL PAIN SYNDROME OF RIGHT KNEE: ICD-10-CM

## 2020-09-29 DIAGNOSIS — M25.561 ACUTE PAIN OF RIGHT KNEE: Primary | ICD-10-CM

## 2020-09-29 PROCEDURE — 97110 THERAPEUTIC EXERCISES: CPT | Performed by: PHYSICAL THERAPIST

## 2020-09-29 PROCEDURE — 97530 THERAPEUTIC ACTIVITIES: CPT | Performed by: PHYSICAL THERAPIST

## 2020-09-29 NOTE — PROGRESS NOTES
Physical Therapy Daily Progress Note    VISIT#: 3    Subjective   Tangela Rodarte reports she did not get sore after her last visit. Her pain level remains high but the exercises are not making it worse. Her knees do feel better with the Ktape.  Current Pain Level: R Knee- 7/10  L Knee: 5/10    Objective   R knee crepitus felt with active extension. None felt in the L knee this session.   Ktape donned before standing activities today.   See Exercise, Manual, and Modality Logs for complete treatment.     Assessment/Plan  Patient continues to tolerate all exercises very well. Decreased crepitus post Ktape.  She opted to ice her knees at home.    Progress per Plan of Care    Goals  Plan Goals: ST visits     1)  Pain levels 0-5/10     2)  Patient to report a 30% improvement in her ability to stand from prolonged sitting and going up the steps.     3)  No reports of the L knee giving out.     LTG   DC     1)  No knee pain at rest and/or with activity     2)  (-) special tests     3)  IHEP     4)  Patient to complete all home activities wo pain, including ascend/descend steps and sit <> stand wo pain.     5)  Improve the Oxford functional knee score =/> 10 points.           Timed:         Manual Therapy:    4     mins  94639;     Therapeutic Exercise:    32     mins  98614;     Therapeutic Activity:     14     mins  84596;       Un-Timed:      Timed Treatment:   50   mins   Total Treatment:     50   mins    Elva Hirsch PTA    Physical Therapist Assistant

## 2020-10-07 ENCOUNTER — TREATMENT (OUTPATIENT)
Dept: PHYSICAL THERAPY | Facility: CLINIC | Age: 25
End: 2020-10-07

## 2020-10-07 DIAGNOSIS — M25.561 ACUTE PAIN OF RIGHT KNEE: Primary | ICD-10-CM

## 2020-10-07 DIAGNOSIS — M22.2X1 PATELLOFEMORAL PAIN SYNDROME OF RIGHT KNEE: ICD-10-CM

## 2020-10-07 PROCEDURE — 97530 THERAPEUTIC ACTIVITIES: CPT | Performed by: PHYSICAL THERAPIST

## 2020-10-07 PROCEDURE — 97110 THERAPEUTIC EXERCISES: CPT | Performed by: PHYSICAL THERAPIST

## 2020-10-07 NOTE — PROGRESS NOTES
Physical Therapy Daily Progress Note    VISIT#: 4    Subjective   Tangela Rodarte reports no pain at rest with either knee. The L knee is better ~4/10 with stairs, squats or prolonged walking with ant knee pain. The R is 7-8/10. Pt states pain is medial and feels really swollen despite icing/elevating.       Objective     See Exercise Logs for complete treatment.     Patient Education: cues for therex for form & avoiding knee hyperext with standing exs    Assessment/Plan Pt tolerated session fairly well with c/o mostly R knee pain by end of session. Pt declined taping an dice as she does them at home.     Progress per Plan of Care and Progress strengthening /stabilization /functional activity        Timed:         Manual Therapy:         mins  21294;     Therapeutic Exercise:   32      mins  51904;     Neuromuscular Cristiano:       mins  25392;    Therapeutic Activity:     8     mins  91347;     Gait Training:           mins  56785;     Ultrasound:          mins  88761;    Ionto                                  mins   10391  Self Care                            mins   70554  Canalith Repos                   mins  59767    Un-Timed:  Electrical Stimulation:         mins  42348 ( );  Dry Needling          mins self-pay  Traction          mins 91383  Low Eval          Mins  44572  Mod Eval          Mins  71061  High Eval                            Mins  83937  Re-Eval                               mins  60869    Timed Treatment:  40    mins   Total Treatment:     40   mins    Moraima Nguyen, PT  [place] License # [id number]  Physical Therapist

## 2020-10-13 ENCOUNTER — TREATMENT (OUTPATIENT)
Dept: PHYSICAL THERAPY | Facility: CLINIC | Age: 25
End: 2020-10-13

## 2020-10-13 DIAGNOSIS — M25.561 ACUTE PAIN OF RIGHT KNEE: Primary | ICD-10-CM

## 2020-10-13 DIAGNOSIS — M22.2X1 PATELLOFEMORAL PAIN SYNDROME OF RIGHT KNEE: ICD-10-CM

## 2020-10-13 PROCEDURE — 97110 THERAPEUTIC EXERCISES: CPT | Performed by: PHYSICAL THERAPIST

## 2020-10-13 PROCEDURE — 97530 THERAPEUTIC ACTIVITIES: CPT | Performed by: PHYSICAL THERAPIST

## 2020-10-13 PROCEDURE — 97112 NEUROMUSCULAR REEDUCATION: CPT | Performed by: PHYSICAL THERAPIST

## 2020-10-13 NOTE — PROGRESS NOTES
"Physical Therapy 30-day  Progress Note    VISIT#: 5    Subjective   Tangela Rodarte reports that she is still having most of the pain on the R knee.  The L does hurt with kneeling and lots of walking.   She states the R hurts with normal short distance walking and sit to stand transition.  [facility] the cable hip 4 way. Denies the left leg feeling like it will give out.   Going up the steps with min pain; \"definitely has improved 30%   L knee pain  2-4/10  R knee pain:  5-8/10        Objective   R knee:  Moderate crepitus, 1 FB lateral pull with quad set.  Palpation with moderate tenderness medial patellar border and MJL.  L knee:  Slight crepitus   MMT:  Quad 4 with pain R;  L 4 wo pain   See Exercise, Manual, and Modality Logs for complete treatment.     Patient Education: review of the ex to complete at the gym.     Assessment/Plan   Tangela has achieved 2/3 STGs and 1 LTG at this time.  The R knee is experiencing the greatest pain level and interference in her functional tasks.  The current treatment plan, interventions and goals remain appropriate.    ST visits     1)  Pain levels 0-5/10 (progressing)     2)  Patient to report a 30% improvement in her ability to stand from prolonged sitting and going up the steps.(met)     3)  No reports of the L knee giving out. (met)    LTG   DC     1)  No knee pain at rest and/or with activity (met)      2)  (-) special tests     3)  IHEP      4)  Patient to complete all home activities wo pain, including ascend/descend steps and sit <> stand wo pain.     5)  Improve the Oxford functional knee score =/> 10 points    Progress per Plan of Care            Timed:         Manual Therapy:         mins  96967;     Therapeutic Exercise:    23     mins  18879;     Neuromuscular Cristiano:    12    mins  77287;    Therapeutic Activity:     11     mins  08958;     Gait Training:           mins  59691;     Ultrasound:          mins  50467;    Ionto                 "                   mins   29372  Self Care                            mins   82070  Canalith Repos                   mins  10917    Un-Timed:  Electrical Stimulation:         mins  80492 ( );  Dry Needling          mins self-pay  Traction          mins 75228  Low Eval          Mins  98374  Mod Eval          Mins  56482  High Eval                            Mins  77447  Re-Eval                               mins  58769    Timed Treatment:   46   mins   Total Treatment:     59   mins    Shayy Oseguera PT    Physical Therapist

## 2020-10-20 ENCOUNTER — TREATMENT (OUTPATIENT)
Dept: PHYSICAL THERAPY | Facility: CLINIC | Age: 25
End: 2020-10-20

## 2020-10-20 DIAGNOSIS — M25.561 ACUTE PAIN OF RIGHT KNEE: Primary | ICD-10-CM

## 2020-10-20 DIAGNOSIS — M22.2X1 PATELLOFEMORAL PAIN SYNDROME OF RIGHT KNEE: ICD-10-CM

## 2020-10-20 PROCEDURE — 97110 THERAPEUTIC EXERCISES: CPT | Performed by: PHYSICAL THERAPIST

## 2020-10-20 PROCEDURE — 97112 NEUROMUSCULAR REEDUCATION: CPT | Performed by: PHYSICAL THERAPIST

## 2020-10-20 PROCEDURE — 97530 THERAPEUTIC ACTIVITIES: CPT | Performed by: PHYSICAL THERAPIST

## 2020-10-20 NOTE — PROGRESS NOTES
Physical Therapy Daily Progress Note    VISIT#: 6    Subjective   Tangela Rodarte reports that the R knne has been more painful, rogelio feels like it's swollen.  The pain location is medial knee.  [facility]        Objective   susan patellar krystyna ( R >L),  1 FB lateral tracking with R isometric quad.  (+) grind R.     See Exercise, Manual, and Modality Logs for complete treatment.     Patient Education:  Y ex reviewed with patient.    Assessment/Plan  Tangela continues to report stiffness and pain in the knees, rogelio the R.  Both knees have excessive knee extension and patellar mobility     Progress per Plan of Care            Timed:         Manual Therapy:   4      mins  12971;     Therapeutic Exercise:    24     mins  64493;     Neuromuscular Cristiano:    12    mins  46165;    Therapeutic Activity:     10     mins  64553;     Gait Training:           mins  11281;     Ultrasound:          mins  84412;    Ionto                                   mins   62980  Self Care                            mins   76542  Canalith Repos                   mins  12437    Un-Timed:  Electrical Stimulation:         mins  69895 ( );  Dry Needling          mins self-pay  Traction          mins 50906  Low Eval          Mins  75313  Mod Eval          Mins  99848  High Eval                            Mins  44660  Re-Eval                               mins  74548    Timed Treatment:   50   mins   Total Treatment:     63   mins    Shayy Oseguera, PT    Physical Therapist

## 2020-10-29 ENCOUNTER — TREATMENT (OUTPATIENT)
Dept: PHYSICAL THERAPY | Facility: CLINIC | Age: 25
End: 2020-10-29

## 2020-10-29 DIAGNOSIS — M22.2X1 PATELLOFEMORAL PAIN SYNDROME OF RIGHT KNEE: ICD-10-CM

## 2020-10-29 DIAGNOSIS — M25.561 ACUTE PAIN OF RIGHT KNEE: Primary | ICD-10-CM

## 2020-10-29 PROCEDURE — 97530 THERAPEUTIC ACTIVITIES: CPT | Performed by: PHYSICAL THERAPIST

## 2020-10-29 PROCEDURE — 97110 THERAPEUTIC EXERCISES: CPT | Performed by: PHYSICAL THERAPIST

## 2020-10-29 NOTE — PROGRESS NOTES
Physical Therapy Daily Progress Note    VISIT#: 7    Subjective   Tangela Rodarte reports she has seen some progress in her L knee but her R knee feels the same. Stairs remain difficult to perform for both legs. She has an appointment with an orthopedic Dr next week 20.  Current Pain Level: L 3/10,   R 7-8/10    Objective     See Exercise, Manual, and Modality Logs for complete treatment.     Patient Education: no new exercises added at this time.    Assessment/Plan  Patient is reporting little to no improvement in her R knee. She tolerated all exercises fine but her pain doesn't seem to improve. Feels like she gets the most benefit from the cable exercises.    Plan: Holding PT until she sees the ortho on 20. She will call the office if they want her to continue PT.    GOALS  ST visits     1)  Pain levels 0-5/10 (progressing)     2)  Patient to report a 30% improvement in her ability to stand from prolonged sitting and going up the steps.(met)     3)  No reports of the L knee giving out. (met)    LTG   DC     1)  No knee pain at rest and/or with activity (met)      2)  (-) special tests     3)  IHEP      4)  Patient to complete all home activities wo pain, including ascend/descend steps and sit <> stand wo pain.     5)  Improve the Oxford functional knee score =/> 10 points             Timed:           Therapeutic Exercise:    22     mins  71815;      Therapeutic Activity:     30     mins  67297;         Un-Timed:      Timed Treatment:   52   mins   Total Treatment:     54   mins    Elva Hirsch PTA    Physical Therapist Assistant

## 2020-11-04 ENCOUNTER — OFFICE VISIT (OUTPATIENT)
Dept: ORTHOPEDIC SURGERY | Facility: CLINIC | Age: 25
End: 2020-11-04

## 2020-11-04 VITALS
WEIGHT: 205 LBS | DIASTOLIC BLOOD PRESSURE: 83 MMHG | BODY MASS INDEX: 37.73 KG/M2 | SYSTOLIC BLOOD PRESSURE: 121 MMHG | HEART RATE: 87 BPM | HEIGHT: 62 IN

## 2020-11-04 DIAGNOSIS — M76.52 PATELLAR TENDINITIS, LEFT KNEE: ICD-10-CM

## 2020-11-04 DIAGNOSIS — M22.2X1 PATELLOFEMORAL PAIN SYNDROME OF RIGHT KNEE: ICD-10-CM

## 2020-11-04 DIAGNOSIS — M25.361 INSTABILITY OF RIGHT KNEE JOINT: ICD-10-CM

## 2020-11-04 DIAGNOSIS — M25.561 ACUTE PAIN OF RIGHT KNEE: Primary | ICD-10-CM

## 2020-11-04 PROCEDURE — 99214 OFFICE O/P EST MOD 30 MIN: CPT | Performed by: FAMILY MEDICINE

## 2020-11-04 RX ORDER — METHYLPREDNISOLONE 4 MG/1
TABLET ORAL
Qty: 21 TABLET | Refills: 0 | Status: SHIPPED | OUTPATIENT
Start: 2020-11-04 | End: 2020-11-11

## 2020-11-04 NOTE — PROGRESS NOTES
Primary Care Sports Medicine Office Visit Note     Patient ID: Tangela Rodarte is a 25 y.o. female.    Chief Complaint:  Chief Complaint   Patient presents with   • Left Knee - Follow-up     Finished physical therapy, left knee has slight decreased in pain, right knee has increased in pain.   • Right Knee - Follow-up     HPI:    Ms. Tangela Rodarte is a 25 y.o. female who presents to the clinic today for follow-up evaluation of bilateral knee pain.  The patient was previously seen nearly 10 weeks ago, and was diagnosed with patellofemoral pain syndrome.  She has been seen in physical therapy, and worked on vastus medialis strength, and patellar balance.  Since then, she states the left knee is only slightly better, but the right knee pain has actually worsened. L knee is only bothering her with stairs, heavy flexion activity. Points to the inferior patellar tendon. R knee has continued to click with movement. Now even feels mildly instable as well. Has never fallen. No pain at rest. Meloxicam has helped significantly with stiffness.     Past Medical History:   Diagnosis Date   • History of anemia as a child    • History of heart murmur in childhood    • Psoriasis    • Psoriasis    • Seborrhea    • Seizures (CMS/HCC)        Past Surgical History:   Procedure Laterality Date   • TONSILLECTOMY     • WISDOM TOOTH EXTRACTION         Family History   Problem Relation Age of Onset   • Diabetes Father    • Hypertension Father    • Cancer Father    • Kidney disease Father    • Diabetes Paternal Grandmother    • Arthritis Paternal Grandmother    • Stroke Paternal Grandfather    • Diabetes Paternal Grandfather      Social History     Occupational History   • Not on file   Tobacco Use   • Smoking status: Never Smoker   • Smokeless tobacco: Never Used   Substance and Sexual Activity   • Alcohol use: Yes     Comment: SOCIAL   • Drug use: No   • Sexual activity: Not on file      Review of Systems   Constitutional: Negative for  "activity change and fever.   Musculoskeletal: Positive for arthralgias.   Skin: Negative for color change and rash.   Neurological: Negative for weakness.     Objective:    /83   Pulse 87   Ht 157.5 cm (62\")   Wt 93 kg (205 lb)   BMI 37.49 kg/m²     Physical Examination:  Physical Exam  Vitals signs and nursing note reviewed.   Constitutional:       General: She is not in acute distress.     Appearance: She is well-developed. She is not diaphoretic.   HENT:      Head: Normocephalic and atraumatic.   Eyes:      Conjunctiva/sclera: Conjunctivae normal.   Pulmonary:      Effort: Pulmonary effort is normal. No respiratory distress.   Musculoskeletal:      Right knee: She exhibits no effusion.      Left knee: She exhibits no effusion.   Skin:     General: Skin is warm.      Capillary Refill: Capillary refill takes less than 2 seconds.   Neurological:      Mental Status: She is alert.       Right Knee Exam     Tenderness   The patient is experiencing tenderness in the lateral retinaculum.    Range of Motion   Extension: normal   Flexion: normal     Tests   Rohit:  Medial - negative Lateral - negative    Other   Erythema: absent  Sensation: normal  Pulse: present  Swelling: none  Effusion: no effusion present    Comments:  +[place] [employer]      Left Knee Exam     Tenderness   The patient is experiencing tenderness in the patellar tendon.    Range of Motion   Extension: normal   Flexion: normal     Other   Erythema: absent  Sensation: normal  Pulse: present  Swelling: none  Effusion: no effusion present          Imaging and other tests:  No new imaging today.     Assessment and Plan:    1. Acute pain of right knee    2. Instability of right knee joint  - MRI Knee Right Without Contrast; Future    3. Patellofemoral pain syndrome of right knee    4. Patellar tendinitis, left knee  - methylPREDNISolone (MEDROL) 4 MG dose pack; Use as directed by package instructions  Dispense: 21 tablet; Refill: 0    Discussed " "patient today that she continues to have popping/clicking, instability about the left knee.  Of course with this symptomatology there is concern for meniscal pathology, but her exam is benign.  She does exhibit some clicking of patellofemoral joint however.  I would like to advance to further imaging, MRI, for patellar chondral evaluation and possible plica.  Otherwise, start Medrol Dosepak for mild residual patellar tendinitis in the left knee status post therapy.  RTC 1 week for MRI results.      Kenny LOMBARDI \"Chance\" Jalen CORONA DO, CAQSM  11/04/20  09:55 EST    Disclaimer: Please note that areas of this note were completed with computer voice recognition software.  Quite often unanticipated grammatical, syntax, homophones, and other interpretive errors are inadvertently transcribed by the computer software. Please excuse any errors that have escaped final proofreading.  "

## 2020-11-10 ENCOUNTER — HOSPITAL ENCOUNTER (OUTPATIENT)
Dept: MRI IMAGING | Facility: HOSPITAL | Age: 25
Discharge: HOME | End: 2020-11-10
Admitting: FAMILY MEDICINE

## 2020-11-10 DIAGNOSIS — M25.361 INSTABILITY OF RIGHT KNEE JOINT: ICD-10-CM

## 2020-11-10 DIAGNOSIS — M25.561 ACUTE PAIN OF RIGHT KNEE: ICD-10-CM

## 2020-11-10 DIAGNOSIS — M22.2X1 PATELLOFEMORAL PAIN SYNDROME OF RIGHT KNEE: ICD-10-CM

## 2020-11-10 PROCEDURE — 73721 MRI JNT OF LWR EXTRE W/O DYE: CPT

## 2020-11-11 ENCOUNTER — OFFICE VISIT (OUTPATIENT)
Dept: ORTHOPEDIC SURGERY | Facility: CLINIC | Age: 25
End: 2020-11-11

## 2020-11-11 VITALS
HEIGHT: 62 IN | HEART RATE: 98 BPM | DIASTOLIC BLOOD PRESSURE: 78 MMHG | WEIGHT: 201 LBS | SYSTOLIC BLOOD PRESSURE: 120 MMHG | BODY MASS INDEX: 36.99 KG/M2

## 2020-11-11 DIAGNOSIS — M22.2X1 PATELLOFEMORAL PAIN SYNDROME OF BOTH KNEES: ICD-10-CM

## 2020-11-11 DIAGNOSIS — M25.561 ACUTE PAIN OF RIGHT KNEE: ICD-10-CM

## 2020-11-11 DIAGNOSIS — M22.2X1 PATELLOFEMORAL PAIN SYNDROME OF RIGHT KNEE: Primary | ICD-10-CM

## 2020-11-11 DIAGNOSIS — M22.2X2 PATELLOFEMORAL PAIN SYNDROME OF BOTH KNEES: ICD-10-CM

## 2020-11-11 PROCEDURE — 99214 OFFICE O/P EST MOD 30 MIN: CPT | Performed by: FAMILY MEDICINE

## 2020-11-11 RX ORDER — MELOXICAM 15 MG/1
15 TABLET ORAL DAILY
Qty: 90 TABLET | Refills: 0 | Status: SHIPPED | OUTPATIENT
Start: 2020-11-11 | End: 2021-08-09 | Stop reason: SDUPTHER

## 2020-11-11 NOTE — PROGRESS NOTES
"Primary Care Sports Medicine Office Visit Note     Patient ID: Tangela Rodarte is a 25 y.o. female.    Chief Complaint:  Chief Complaint   Patient presents with   • Right Knee - Follow-up     MRI 11/10/20 @ [facility]     HPI:    Ms. Tangela Rodarte is a 25 y.o. female who returns to the clinic today for follow-up evaluation of bilateral and right knee pain.  Patient previously been seen, started on anti-inflammatory medication, and attempted Medrol Dosepak taper as well.  These medications have helped moderately, and is status post 6 weeks of physical therapy, she continues to have achy pain.  For that reason, and concern for mild effusion, MRI was ordered.  Please see results below.  Patient states she continues to have deep achy knee pain with stairs, and any/all flexion activity.    Past Medical History:   Diagnosis Date   • History of anemia as a child    • History of heart murmur in childhood    • Psoriasis    • Psoriasis    • Seborrhea    • Seizures (CMS/HCC)        Past Surgical History:   Procedure Laterality Date   • TONSILLECTOMY     • WISDOM TOOTH EXTRACTION         Family History   Problem Relation Age of Onset   • Diabetes Father    • Hypertension Father    • Cancer Father    • Kidney disease Father    • Diabetes Paternal Grandmother    • Arthritis Paternal Grandmother    • Stroke Paternal Grandfather    • Diabetes Paternal Grandfather      Social History     Occupational History   • Not on file   Tobacco Use   • Smoking status: Never Smoker   • Smokeless tobacco: Never Used   Substance and Sexual Activity   • Alcohol use: Yes     Comment: SOCIAL   • Drug use: No   • Sexual activity: Not on file      Review of Systems   Constitutional: Negative for activity change and fever.   Musculoskeletal: Positive for arthralgias.   Skin: Negative for color change and rash.   Neurological: Negative for weakness.       Objective:    /78   Pulse 98   Ht 157.5 cm (62\")   Wt 91.2 kg (201 lb)   BMI 36.76 kg/m² "     Physical Examination:  Physical Exam  Vitals signs and nursing note reviewed.   Constitutional:       General: She is not in acute distress.     Appearance: She is well-developed. She is not diaphoretic.   HENT:      Head: Normocephalic and atraumatic.   Eyes:      Conjunctiva/sclera: Conjunctivae normal.   Pulmonary:      Effort: Pulmonary effort is normal. No respiratory distress.   Musculoskeletal:      Right knee: She exhibits effusion (trace).   Skin:     General: Skin is warm.      Capillary Refill: Capillary refill takes less than 2 seconds.   Neurological:      Mental Status: She is alert.       Right Knee Exam     Tenderness   The patient is experiencing tenderness in the patellar tendon (TTP to quad tendon insertion, hoffas squeeze).    Range of Motion   Extension: normal   Flexion: normal     Other   Erythema: absent  Sensation: normal  Pulse: present  Swelling: none  Effusion: effusion (trace) present          Imaging and other tests:  MRI right knee dated 11/10/2020 yields the following IMPRESSION:  1. Mild distal quadriceps and proximal patellar tendinopathy with mild  likely reactive edema in the anterior infrapatellar fat pad and the  prepatellar soft tissues.  2. Small joint effusion with a tiny popliteal cyst.  3. The menisci, cruciate ligaments, collateral ligaments, and articular  cartilage are intact.    Assessment and Plan:    1. Acute pain of right knee  - Ambulatory Referral to Physical Therapy Evaluate and treat; Stretching, ROM, Strengthening    2. Patellofemoral pain syndrome of right knee  - Ambulatory Referral to Physical Therapy Evaluate and treat; Stretching, ROM, Strengthening    I discussed at length with this patient her MRI results.  Questions answered.  Pathology includes extra-articular off of fat pad impingement syndrome, but no intra-articular problems.  I recommend we continue physical therapy, with the addition of phonophoresis with dexamethasone.  RTC in 2-3 months if no  "improvement, for further treatment options at that time if warranted.    Kenny LOMBARDI \"Chance\" Jalen CORONA, , CAQSM  11/11/20  11:15 EST    Disclaimer: Please note that areas of this note were completed with computer voice recognition software.  Quite often unanticipated grammatical, syntax, homophones, and other interpretive errors are inadvertently transcribed by the computer software. Please excuse any errors that have escaped final proofreading.  "

## 2020-11-17 ENCOUNTER — TREATMENT (OUTPATIENT)
Dept: PHYSICAL THERAPY | Facility: CLINIC | Age: 25
End: 2020-11-17

## 2020-11-17 DIAGNOSIS — M22.2X1 PATELLOFEMORAL PAIN SYNDROME OF RIGHT KNEE: ICD-10-CM

## 2020-11-17 DIAGNOSIS — M25.561 ACUTE PAIN OF RIGHT KNEE: Primary | ICD-10-CM

## 2020-11-17 PROCEDURE — 97110 THERAPEUTIC EXERCISES: CPT | Performed by: PHYSICAL THERAPIST

## 2020-11-17 PROCEDURE — 97164 PT RE-EVAL EST PLAN CARE: CPT | Performed by: PHYSICAL THERAPIST

## 2020-11-17 PROCEDURE — 97035 APP MDLTY 1+ULTRASOUND EA 15: CPT | Performed by: PHYSICAL THERAPIST

## 2020-11-17 NOTE — PROGRESS NOTES
Re-Assessment / Progress Note    Patient: Tangela Rodarte   : 1995  Diagnosis/ICD-10 Code:  Acute pain of right knee [M25.561]  Referring practitioner: Kenny Rao I*  Date of Initial Visit: Episode Type: THERAPY  Noted: 2020    Today's Date: 2020  Patient seen for 8 sessions.      Subjective:   Tangela Rodarte reports: that her pain is still about the same with her R knee pain a 7/10 and her L knee is a 4/10. She has the most difficulty with stairs, sit to/from stand transfers, floor transfers, and transferring into and out of the car.   Subjective Questionnaire: Oxford Knee:   Clinical Progress: unchanged  Home Program Compliance: Yes  Treatment has included: therapeutic exercise, neuromuscular re-education, manual therapy, therapeutic activity, electrical stimulation, moist heat and cryotherapy    Subjective Evaluation    Patient Goals  Patient goals for therapy: decreased pain, improved balance, increased motion, increased strength, independence with ADLs/IADLs, return to sport/leisure activities and return to work         Objective           General Comments     Knee Comments    Right Knee   Tenderness in the medial joint line.     Active Range of Motion   Left Knee   Normal active range of motion    Right Knee   Normal active range of motion    Patellar Mobility   Left Knee Hypermobile in the left medial, left lateral, left superior and left inferior patellar tendon(s).     Right Knee Hypermobile in the medial, lateral, superior and inferior patellar tendon(s).     Strength/Myotome Testing     Left Knee   Flexion: 4+  Extension: 4+  Quadriceps contraction: good    Right Knee   Flexion: 4+  Extension: 4+  Quadriceps contraction: good    Tests     Left Knee   Positive patella-femoral grind.     Right Knee   Positive patellar compression.                  Assessment & Plan     Assessment  Assessment details: Assessment & Plan     Assessment  Impairments: abnormal gait, abnormal or  restricted ROM, activity intolerance, impaired physical strength, lacks appropriate home exercise program and pain with function  Mrs Rodarte is a 26 yr old female referred to PT due to a recent onset of R knee pain and ongoing L knee pain.  At the initial PT evaluation the pt presented with bilateral PFS with associated weak quad/VMO/hip ER, pes planus.   It was noted that she did have some L knee pain with Rohit and Thessaly tests which are suggestive of meniscus involvement.  Today the pt presents with some improvements in BLE strengthening with less incidents of her knee giving out, but is continuing to have RUDDY knee pain and functional limitations of decreased ability to transfer and navigate stairs. Pt also recently had Dexamethasone added to her treatment plan for phonophoresis. She would benefit from outpatient PT in order to achieve the stated goals and maximize her functional mobility.     Prognosis: good    Goals  Plan Goals: Goals  Plan Goals: ST visits     1)  Pain levels 0-5/10- progressing     2)  Patient to report a 30% improvement in her ability to stand from prolonged sitting and going up the steps.-progressing     3)  No reports of the L knee giving out. - progressing    LTG   DC     1)  No knee pain at rest and/or with activity- progressing no pain at rest     2)  (-) special tests- progresing     3)  IHEP- progressing     4)  Patient to complete all home activities wo pain, including ascend/descend steps and sit <> stand wo pain.-progressing     5)  Improve the Oxford functional knee score =/> 10 points. - progressing    Plan  Therapy options: will be seen for skilled physical therapy services  Planned modality interventions: thermotherapy (hydrocollator packs), TENS, high voltage pulsed current (pain management), electrical stimulation/Russian stimulation, cryotherapy, ultrasound and iontophoresis  Other planned modality interventions: phonophoresis  Duration in visits: 16  Treatment  plan discussed with: patient      Progress toward previous goals: Partially Met    Recommendations: Continue as planned  Timeframe: 2 months  Prognosis to achieve goals: good    PT Signature: Carol De La Cruz, PT  [place] Lic. # [id number]        Based upon review of the patient's progress and continued therapy plan, it is my medical opinion that Tangela Rodarte should continue physical therapy treatment at [facility]  [address] [facility] [[email or web address] [address]  [place] [address] [phone].    Signature: __________________________________  Kenny Rao II, DO    Timed:         Manual Therapy:         mins  09916;     Therapeutic Exercise:    15     mins  69250;     Neuromuscular Cristiano:        mins  16825;    Therapeutic Activity:          mins  13896;     Gait Training:           mins  46753;     Ultrasound:      10    mins  84567;    Ionto                                   mins   19483  Self Care                            mins   26748        Un-Timed:  Electrical Stimulation:         mins  04314 ( );  Dry Needling          mins self-pay  Traction          mins 61619  Low Eval          Mins  17906  Mod Eval          Mins  50145  High Eval                            Mins  84233  Re-Eval                          20   mins  89374        Timed Treatment:   25   mins   Total Treatment:     55   mins

## 2020-11-24 ENCOUNTER — TREATMENT (OUTPATIENT)
Dept: PHYSICAL THERAPY | Facility: CLINIC | Age: 25
End: 2020-11-24

## 2020-11-24 DIAGNOSIS — M22.2X1 PATELLOFEMORAL PAIN SYNDROME OF RIGHT KNEE: ICD-10-CM

## 2020-11-24 DIAGNOSIS — M25.561 ACUTE PAIN OF RIGHT KNEE: Primary | ICD-10-CM

## 2020-11-24 PROCEDURE — 97112 NEUROMUSCULAR REEDUCATION: CPT | Performed by: PHYSICAL THERAPIST

## 2020-11-24 PROCEDURE — 97035 APP MDLTY 1+ULTRASOUND EA 15: CPT | Performed by: PHYSICAL THERAPIST

## 2020-11-24 PROCEDURE — 97110 THERAPEUTIC EXERCISES: CPT | Performed by: PHYSICAL THERAPIST

## 2020-11-24 NOTE — PROGRESS NOTES
10Physical Therapy Daily Progress Note    VISIT#: 9    Subjective   Tangela Rodarte reports the phono has helped and it doesn't feel like there's as much liquid buildup medially. Pt states pain was 7-8 and now 5-6 after phono Rx. Pt states the prior therapist told her we'd be moving around where we do the phono due to her multiple points of pain.     Pain Rating (0-10): 5-6    Objective     See Exercise Logs for complete treatment.     Patient Education: cues for therex    Assessment/Plan Pt felt some immediate relief after phono at both areas that were treated. Mat exs for stabilization & CC for therex today. Pt did not require ice at end of session.     Progress per Plan of Care and Progress strengthening /stabilization /functional activity as tolerated as symptoms improve.             Timed:         Manual Therapy:         mins  05689;     Therapeutic Exercise:   13     mins  55606;     Neuromuscular Cristiano:  15    mins  73441;    Therapeutic Activity:          mins  72082;     Gait Training:           mins  79030;     Ultrasound:    12     mins  05695;    Ionto                                   mins   10885  Self Care                            mins   28362  Canalith Repos                   mins  61389    Un-Timed:  Electrical Stimulation:         mins  61738 ( );  Dry Needling          mins self-pay  Traction          mins 42923  Low Eval          Mins  83809  Mod Eval          Mins  43859  High Eval                           Mins  61818  Re-Eval                               mins  79544    Timed Treatment:  40    mins   Total Treatment:     40   mins    Moraima Nguyen, PT  [place] License # [id number]  Physical Therapist

## 2020-12-01 ENCOUNTER — TREATMENT (OUTPATIENT)
Dept: PHYSICAL THERAPY | Facility: CLINIC | Age: 25
End: 2020-12-01

## 2020-12-01 DIAGNOSIS — M22.2X1 PATELLOFEMORAL PAIN SYNDROME OF RIGHT KNEE: ICD-10-CM

## 2020-12-01 DIAGNOSIS — M25.561 ACUTE PAIN OF RIGHT KNEE: Primary | ICD-10-CM

## 2020-12-01 PROCEDURE — 97110 THERAPEUTIC EXERCISES: CPT | Performed by: PHYSICAL THERAPIST

## 2020-12-01 PROCEDURE — 97035 APP MDLTY 1+ULTRASOUND EA 15: CPT | Performed by: PHYSICAL THERAPIST

## 2020-12-01 NOTE — PROGRESS NOTES
Physical Therapy Daily Progress Note    VISIT#: 10    Subjective   Tangela Rodarte reports that she is doing well today with decreased swelling noted overal  Pain Rating (0/10): 5    Objective     See Exercise, Manual, and Modality Logs for complete treatment.       Assessment/Plan   Phono continued today with supera-medial aspect and infera medial aspect of RLE receiving phono today. Pt reported a positive response to KT taping previous with implementation of taping added today.     Plan  Progress per Plan of Care and Progress strengthening /stabilization /functional activity Progress to higher level functional activities next session.             Timed:         Manual Therapy:    3     mins  55639;     Therapeutic Exercise:    30     mins  24873;     Neuromuscular Cristiano:        mins  92252;    Therapeutic Activity:          mins  35768;     Gait Training:           mins  52304;     Ultrasound:    12      mins  70620;    Ionto                                   mins   30661  Self Care                            mins   32539    Un-Timed:  Electrical Stimulation:         mins  80227 ( );  Dry Needling          mins self-pay  Traction          mins 11132  Low Eval          Mins  55335  Mod Eval          Mins  74344  High Eval                            Mins  31608  Re-Eval                               mins  73014    Timed Treatment:   45   mins   Total Treatment:     45   mins    Carol De La Cruz PT, DPT  Physical Therapist

## 2020-12-09 ENCOUNTER — TREATMENT (OUTPATIENT)
Dept: PHYSICAL THERAPY | Facility: CLINIC | Age: 25
End: 2020-12-09

## 2020-12-09 DIAGNOSIS — M25.561 ACUTE PAIN OF RIGHT KNEE: Primary | ICD-10-CM

## 2020-12-09 PROCEDURE — 97035 APP MDLTY 1+ULTRASOUND EA 15: CPT | Performed by: PHYSICAL THERAPIST

## 2020-12-09 PROCEDURE — 97110 THERAPEUTIC EXERCISES: CPT | Performed by: PHYSICAL THERAPIST

## 2020-12-09 NOTE — PROGRESS NOTES
Physical Therapy Daily Progress Note    VISIT#: 11    Subjective   Tangela Rodarte reports: Her knee has been doing better but has difficulty taking the stairs, bending down, and rising after sitting on the floor.   Current Pain Level: 4-5/10 in the R knee    Objective     See Exercise, Manual, and Modality Logs for complete treatment.     Patient Education: cues for there ex.    Assessment/Plan   Patient responded well to ultrasounds with phonophoresis and the tape has been helping her. Performed all of the exercises without any reports of increased pain. Showed no sign of fatigue towards the end of the session.     Plan: Plan: Progress note for the next visit     Goals  Plan Goals: Goals  Plan Goals: ST visits     1)  Pain levels 0-5/10- progressing     2)  Patient to report a 30% improvement in her ability to stand from prolonged sitting and going up the steps.-progressing     3)  No reports of the L knee giving out. - progressing    LTG   DC     1)  No knee pain at rest and/or with activity- progressing no pain at rest     2)  (-) special tests- progresing     3)  IHEP- progressing     4)  Patient to complete all home activities wo pain, including ascend/descend steps and sit <> stand wo pain.-progressing     5)  Improve the Oxford functional knee score =/> 10 points. - progressing                      Timed:         Manual Therapy:    3     mins  35331;     Therapeutic Exercise:    30     mins  05721;       Ultrasound:     12     mins  06693;              Timed Treatment:   45   mins   Total Treatment:     45   mins    Elva Hirsch PTA    Physical Therapist Assistant

## 2020-12-15 ENCOUNTER — TREATMENT (OUTPATIENT)
Dept: PHYSICAL THERAPY | Facility: CLINIC | Age: 25
End: 2020-12-15

## 2020-12-15 DIAGNOSIS — M25.561 ACUTE PAIN OF RIGHT KNEE: Primary | ICD-10-CM

## 2020-12-15 DIAGNOSIS — M22.2X1 PATELLOFEMORAL PAIN SYNDROME OF RIGHT KNEE: ICD-10-CM

## 2020-12-15 PROCEDURE — 97164 PT RE-EVAL EST PLAN CARE: CPT | Performed by: PHYSICAL THERAPIST

## 2020-12-15 PROCEDURE — 97110 THERAPEUTIC EXERCISES: CPT | Performed by: PHYSICAL THERAPIST

## 2020-12-15 PROCEDURE — 97035 APP MDLTY 1+ULTRASOUND EA 15: CPT | Performed by: PHYSICAL THERAPIST

## 2020-12-15 NOTE — PROGRESS NOTES
"Re-Assessment / Progress Note    Patient: Tangela Rodarte   : 1995  Diagnosis/ICD-10 Code:  Acute pain of right knee [M25.561]  Referring practitioner: Kenny Rao I*  Date of Initial Visit: Episode Type: THERAPY  Noted: 2020    Today's Date: 12/15/2020  Patient seen for 12 sessions.      Subjective:   Tangela Rodarte reports: that her R knee pain is a 6/10 and her L knee is a 3/10 with pt reporting that she has decreased pain overall and has noticed decreased \"clicking\" in her R knee. Pt also reports that she no longer feels weakness is her limiting factor, but more so pain while performing specific activities. Currently the most difficult activities are stairs, sit to stand transfers and floor transfers with pain noted most while her knee is bent.   Subjective Questionnaire: Oxford knee score: 25/48  Clinical Progress: improved  Home Program Compliance: Yes  Treatment has included: therapeutic exercise, neuromuscular re-education, manual therapy, therapeutic activity, electrical stimulation, ultrasound, moist heat and Phonophoresis    Subjective   Objective           General Comments     Knee Comments  Right Knee   Tenderness in the medial joint line.     Active Range of Motion   Left Knee   Normal active range of motion    Right Knee   Normal active range of motion    Patellar Mobility   Left Knee Hypermobile in the left medial, left lateral, left superior and left inferior patellar tendon(s).     Right Knee Hypermobile in the medial, lateral, superior and inferior patellar tendon(s).     Strength/Myotome Testing     Left Knee   Flexion: 5  Extension: 5  Quadriceps contraction: good    Right Knee   Flexion: 5  Extension: 5  Quadriceps contraction: good    Tests     Left Knee   Positive patella-femoral grind.     Right Knee   Positive patellar compression.              Assessment & Plan     Assessment  Impairments: abnormal coordination, abnormal muscle firing, abnormal muscle tone, activity " intolerance, impaired balance and pain with function  Assessment details: Mrs Rodarte is a 26 yr old female referred to PT due to a recent onset of R knee pain and ongoing L knee pain.  At the initial PT evaluation the pt presented with bilateral PFS with associated weak quad/VMO/hip ER, pes planus.   It was noted that she did have some L knee pain with Rohit and Thessaly tests which are suggestive of meniscus involvement.  Today the pt presents with some improvements in BLE strengthening with no incidents of her knee giving out, but is continuing to have RUDDY knee pain and functional limitations of decreased ability to transfer and navigate stairs. Pt  had Dexamethasone added to her treatment plan for phonophoresis with a positive response noted with treatment but her impairments still remain, to a lesser degree. She would benefit from outpatient PT in order to achieve the stated goals and maximize her functional mobility.           Goals  Plan Goals:   Goals  Plan Goals: Goals  Plan Goals: ST visits     1)  Pain levels 0-5/10- progressing     2)  Patient to report a 30% improvement in her ability to stand from prolonged sitting and going up the steps.-Met pt reports 50-60% improvement     3)  No reports of the L knee giving out. - MET    LTG   DC     1)  No knee pain at rest and/or with activity- progressing no pain at rest     2)  (-) special tests- progresing     3)  IHEP- MET     4)  Patient to complete all home activities wo pain, including ascend/descend steps and sit <> stand wo pain.-progressing     5)  Improve the Oxford functional knee score =/> 10 points. - progressing    Plan  Therapy options: will be seen for skilled physical therapy services  Duration in visits: 10  Treatment plan discussed with: patient  Plan details: Pt with continue with phonophoresis with gradual increase in strengthening for functional activities.       Progress toward previous goals: Partially Met           Recommendations: Continue as planned  Timeframe: 6 weeks  Prognosis to achieve goals: good    PT Signature: Carol De La Cruz, PT  [place] Lic. # [id number]        Based upon review of the patient's progress and continued therapy plan, it is my medical opinion that Tangela Rodarte should continue physical therapy treatment at [facility]  [address]  [place] [address] [phone].    Signature: __________________________________  Kenny Rao II,     Timed:         Manual Therapy:    3     mins  33862;     Therapeutic Exercise:    15     mins  58601;     Neuromuscular Cristiano:        mins  76218;    Therapeutic Activity:          mins  21061;     Gait Training:           mins  73645;     Ultrasound:   12       mins  14672;    Ionto                                   mins   03194  Self Care                            mins   33040        Un-Timed:  Electrical Stimulation:         mins  71569 ( );  Dry Needling          mins self-pay  Traction          mins 66194  Low Eval          Mins  96702  Mod Eval          Mins  98291  High Eval                            Mins  99037  Re-Eval                           15    mins  92230        Timed Treatment:   30   mins   Total Treatment:     45   mins

## 2020-12-22 ENCOUNTER — TREATMENT (OUTPATIENT)
Dept: PHYSICAL THERAPY | Facility: CLINIC | Age: 25
End: 2020-12-22

## 2020-12-22 DIAGNOSIS — M22.2X1 PATELLOFEMORAL PAIN SYNDROME OF RIGHT KNEE: ICD-10-CM

## 2020-12-22 DIAGNOSIS — M25.561 ACUTE PAIN OF RIGHT KNEE: Primary | ICD-10-CM

## 2020-12-22 PROCEDURE — 97110 THERAPEUTIC EXERCISES: CPT | Performed by: PHYSICAL THERAPIST

## 2020-12-22 PROCEDURE — 97035 APP MDLTY 1+ULTRASOUND EA 15: CPT | Performed by: PHYSICAL THERAPIST

## 2020-12-22 NOTE — PROGRESS NOTES
Physical Therapy Daily Progress Note    VISIT#: 13    Subjective   Tangela Rodarte reports that she had increased pain during the second half of last week due to missing her pain pill one day and had decreased movement while at work due to increased work load. Pt reports that she is doing better overall today than she has been the past few days but still has high levels of pain.   Pain Rating (0/10): 8 RLE, 4/10 LLE    Objective     See Exercise, Manual, and Modality Logs for complete treatment.     Patient Education: Pt was educated on appropriate exercises to perform at the gym along with how to modify exercises to compliment what is being performed at PT.     Assessment/Plan   Pt continues to progress functional knee strengthening and stability exercises along with phonophoresis for pain management with pt demonstrating good overall tolerance today. Pt expressed concern if she is to continue with phonophoresis or not, but reports that she is continuing to notice improvements with the intervention.     Plan  Progress per Plan of Care and Progress strengthening /stabilization /functional activity            Timed:         Manual Therapy:    3     mins  18818;     Therapeutic Exercise:    25     mins  65000;     Neuromuscular Cristiano:        mins  48807;    Therapeutic Activity:          mins  25965;     Gait Training:           mins  36475;     Ultrasound:     15     mins  78727;    Ionto                                   mins   06092  Self Care                            mins   47982    Un-Timed:  Electrical Stimulation:         mins  12430 ( );  Dry Needling          mins self-pay  Traction          mins 56054  Low Eval          Mins  88958  Mod Eval          Mins  24412  High Eval                            Mins  68960  Re-Eval                               mins  40667    Timed Treatment:   43   mins   Total Treatment:     43   mins    Carol De La Cruz PT, DPT  Physical Therapist

## 2021-01-06 ENCOUNTER — TREATMENT (OUTPATIENT)
Dept: PHYSICAL THERAPY | Facility: CLINIC | Age: 26
End: 2021-01-06

## 2021-01-06 DIAGNOSIS — M22.2X1 PATELLOFEMORAL PAIN SYNDROME OF RIGHT KNEE: ICD-10-CM

## 2021-01-06 DIAGNOSIS — M25.561 ACUTE PAIN OF RIGHT KNEE: Primary | ICD-10-CM

## 2021-01-06 PROCEDURE — 97110 THERAPEUTIC EXERCISES: CPT | Performed by: PHYSICAL THERAPIST

## 2021-01-06 PROCEDURE — 97035 APP MDLTY 1+ULTRASOUND EA 15: CPT | Performed by: PHYSICAL THERAPIST

## 2021-01-06 PROCEDURE — 97530 THERAPEUTIC ACTIVITIES: CPT | Performed by: PHYSICAL THERAPIST

## 2021-01-06 NOTE — PROGRESS NOTES
Re-Assessment / Progress Note    Patient: Tangela Rodarte   : 1995  Diagnosis/ICD-10 Code:  Acute pain of right knee [M25.561]  Referring practitioner: Kenny Rao I*  Date of Initial Visit: Episode Type: THERAPY  Noted: 2020    Today's Date: 2021  Patient seen for 14 sessions.      Subjective:   Tangela Rodarte reports: that she has good and bad days with today being a mediocre day. Pt reports that she is still making improvements overall. Pt reports that the most difficult things for her to perform is a floor transfer and navigating stairs  Subjective Questionnaire: Oxford knee score: 21  Clinical Progress: improved  Home Program Compliance: Yes  Treatment has included: therapeutic exercise, neuromuscular re-education, manual therapy, therapeutic activity, electrical stimulation, ultrasound, moist heat and cryotherapy    Subjective   Objective   Assessment/Plan  Progress toward previous goals: Partially Met  Knee Comments  Right Knee   Tenderness in the medial joint line.     Active Range of Motion   Left Knee   Normal active range of motion    Right Knee   Normal active range of motion    Patellar Mobility   Left Knee Hypermobile in the left medial, left lateral, left superior and left inferior patellar tendon(s).     Right Knee Hypermobile in the medial, lateral, superior and inferior patellar tendon(s).     Strength/Myotome Testing     Left Knee   Flexion: 5  Extension: 5  Quadriceps contraction: good    Right Knee   Flexion: 5  Extension: 5  Quadriceps contraction: good    Tests     Left Knee   Positive patella-femoral grind.     Right Knee   Positive patellar compression.              Assessment & Plan     Assessment  Impairments: abnormal coordination, abnormal muscle firing, abnormal muscle tone, activity intolerance, impaired balance and pain with function  Assessment details: Mrs Rodarte is a 26 yr old female referred to PT due to a recent onset of R knee pain and ongoing L knee  pain.  At the initial PT evaluation the pt presented with bilateral PFS with associated weak quad/VMO/hip ER, pes planus.   It was noted that she did have some L knee pain with Rohit and Thessaly tests which are suggestive of meniscus involvement.  Today the pt presents with some improvements in BLE strengthening with no incidents of her knee giving out, but is continuing to have RUDDY knee pain and functional limitations of decreased ability to transfer and navigate stairs. Pt with improving tolerance of stair progressions.  Pt  had Dexamethasone added to her treatment plan for phonophoresis with a positive response noted with treatment but her impairments still remain, to a lesser degree. She would benefit from outpatient PT in order to achieve the stated goals and maximize her functional mobility.           Goals  Plan Goals:   Goals  Plan Goals: Goals  Plan Goals: ST visits     1)  Pain levels 0-5/10- progressing- 6/10 on RLE today 21     2)  Patient to report a 30% improvement in her ability to stand from prolonged sitting and going up the steps.-Met pt reports 50-60% improvement     3)  No reports of the L knee giving out. - MET    LTG   DC     1)  No knee pain at rest and/or with activity- progressing no pain at rest     2)  (-) special tests- progresing     3)  IHEP- MET     4)  Patient to complete all home activities wo pain, including ascend/descend steps and sit <> stand wo pain.-progressing     5)  Improve the Oxford functional knee score =/> 10 points. - progressing 21 today 21        Recommendations: Continue as planned  Timeframe: 1 month  Prognosis to achieve goals: good    PT Signature: Carol De La Cruz, PT  [place] Lic. # [id number]        Based upon review of the patient's progress and continued therapy plan, it is my medical opinion that Tangela Rodarte should continue physical therapy treatment at [facility]  [address]  [place]  [place] [address] [phone].    Signature: __________________________________  Kenny Rao II, DO    Timed:         Manual Therapy:    3     mins  09296;     Therapeutic Exercise:    20     mins  02846;     Neuromuscular Cristiano:        mins  85576;    Therapeutic Activity:     10     mins  21671;     Gait Training:           mins  97619;     Ultrasound:     15     mins  00421;    Ionto                                   mins   37837  Self Care                            mins   56048        Un-Timed:  Electrical Stimulation:         mins  86514 ( );  Dry Needling          mins self-pay  Traction          mins 14031  Low Eval          Mins  74255  Mod Eval          Mins  29782  High Eval                            Mins  75591  Re-Eval                               mins  24063        Timed Treatment:   48   mins   Total Treatment:     48   mins

## 2021-01-12 ENCOUNTER — TELEPHONE (OUTPATIENT)
Dept: ORTHOPEDICS | Facility: OTHER | Age: 26
End: 2021-01-12

## 2021-02-15 ENCOUNTER — OFFICE VISIT (OUTPATIENT)
Dept: ORTHOPEDIC SURGERY | Facility: CLINIC | Age: 26
End: 2021-02-15

## 2021-02-15 VITALS
DIASTOLIC BLOOD PRESSURE: 73 MMHG | RESPIRATION RATE: 18 BRPM | HEART RATE: 77 BPM | BODY MASS INDEX: 37.58 KG/M2 | WEIGHT: 204.23 LBS | HEIGHT: 62 IN | OXYGEN SATURATION: 98 % | SYSTOLIC BLOOD PRESSURE: 117 MMHG

## 2021-02-15 DIAGNOSIS — M22.2X1 PATELLOFEMORAL DISORDER OF RIGHT KNEE: Primary | ICD-10-CM

## 2021-02-15 PROCEDURE — 99213 OFFICE O/P EST LOW 20 MIN: CPT | Performed by: FAMILY MEDICINE

## 2021-02-15 RX ORDER — MELOXICAM 15 MG/1
15 TABLET ORAL DAILY PRN
Qty: 30 TABLET | Refills: 4 | Status: SHIPPED | OUTPATIENT
Start: 2021-02-15 | End: 2021-07-20

## 2021-02-15 NOTE — PROGRESS NOTES
"Primary Care Sports Medicine Office Visit Note     Patient ID: Tangela Rodarte is a 25 y.o. female.    Chief Complaint:  Chief Complaint   Patient presents with   • Right Knee - Follow-up     HPI:    Ms. Tangela Rodarte is a 25 y.o. female who presents to the clinic today for follow up evaluation of right knee patellofemoral pain. She has been in therapy after initial eval. 3 months in PT, doing well. Becoming stronger. Most recently added topical corticosteroid with phonophoresis in PT. Seems to not had much of a difference with steroid. Mild achy pain persists. Clicking is improved however. Mild pain to L knee now as well, same location.     Past Medical History:   Diagnosis Date   • History of anemia as a child    • History of heart murmur in childhood    • Psoriasis    • Psoriasis    • Seborrhea    • Seizures (CMS/HCC)        Past Surgical History:   Procedure Laterality Date   • TONSILLECTOMY     • WISDOM TOOTH EXTRACTION         Family History   Problem Relation Age of Onset   • Diabetes Father    • Hypertension Father    • Cancer Father    • Kidney disease Father    • Diabetes Paternal Grandmother    • Arthritis Paternal Grandmother    • Stroke Paternal Grandfather    • Diabetes Paternal Grandfather      Social History     Occupational History   • Not on file   Tobacco Use   • Smoking status: Never Smoker   • Smokeless tobacco: Never Used   Substance and Sexual Activity   • Alcohol use: Yes     Comment: SOCIAL   • Drug use: No   • Sexual activity: Not on file      Review of Systems   Constitutional: Negative for activity change and fever.   Musculoskeletal: Positive for arthralgias.   Skin: Negative for color change and rash.   Neurological: Negative for weakness.       Objective:    /73   Pulse 77   Resp 18   Ht 157.5 cm (62\")   Wt 92.6 kg (204 lb 3.7 oz)   SpO2 98%   BMI 37.35 kg/m²     Physical Examination:  Physical Exam  Vitals signs and nursing note reviewed.   Constitutional:       " "General: She is not in acute distress.     Appearance: She is well-developed. She is not diaphoretic.   HENT:      Head: Normocephalic and atraumatic.   Eyes:      Conjunctiva/sclera: Conjunctivae normal.   Pulmonary:      Effort: Pulmonary effort is normal. No respiratory distress.   Musculoskeletal:      Right knee: She exhibits no effusion.   Skin:     General: Skin is warm.      Capillary Refill: Capillary refill takes less than 2 seconds.   Neurological:      Mental Status: She is alert.       Right Ankle Exam     Range of Motion   The patient has normal right ankle ROM.      Right Knee Exam     Muscle Strength   The patient has normal right knee strength.    Tenderness   The patient is experiencing tenderness in the medial retinaculum.    Range of Motion   The patient has normal right knee ROM.  Extension: normal   Flexion: normal     Tests   Rohit:  Medial - negative Lateral - negative  Varus: negative Valgus: negative  Lachman:  Anterior - negative      Drawer:  Anterior - negative    Posterior - negative    Other   Erythema: absent  Sensation: normal (Distal to the knee. DP and PT palpable. )  Pulse: present  Swelling: none  Effusion: no effusion present      Right Hip Exam     Range of Motion   The patient has normal right hip ROM.          Imaging and other tests:  No new imaging today.    Assessment and Plan:    1. Patellofemoral disorder of right knee  - meloxicam (MOBIC) 15 MG tablet; Take 1 tablet by mouth Daily As Needed for Mild Pain .  Dispense: 30 tablet; Refill: 4    Though pain persists, this patient seems to be moving in the right direction with physical therapy, stretching, and strengthening.  I recommend she continue current treatment plan.  Meloxicam prescription refill.    Kenny LOMBARDI \"Chance\" Jalen CORONA DO, CAQSM  02/17/21  13:05 EST    Disclaimer: Please note that areas of this note were completed with computer voice recognition software.  Quite often unanticipated grammatical, syntax, " homophones, and other interpretive errors are inadvertently transcribed by the computer software. Please excuse any errors that have escaped final proofreading.

## 2021-04-12 ENCOUNTER — DOCUMENTATION (OUTPATIENT)
Dept: PHYSICAL THERAPY | Facility: CLINIC | Age: 26
End: 2021-04-12

## 2021-04-12 NOTE — PROGRESS NOTES
Discharge Summary  Discharge Summary from Physical Therapy Report      Date of Initial PT visit: 9/2/20  Number of Visits Seen: 14        1)  Pain levels 0-5/10- progressing- 6/10 on RLE today 1/6/21     2)  Patient to report a 30% improvement in her ability to stand from prolonged sitting and going up the steps.-Met pt reports 50-60% improvement     3)  No reports of the L knee giving out. - MET    LTG   DC     1)  No knee pain at rest and/or with activity- not MET     2)  (-) special tests- not MET     3)  IHEP- MET     4)  Patient to complete all home activities wo pain, including ascend/descend steps and sit <> stand wo pain-not MET     5)  Improve the Oxford functional knee score =/> 10 points.-not MET    Goals: Partially Met    Discharge Plan: Continue with current home exercise program as instructed  Pt did not return or schedule more visits following her last session       Date of Discharge 4/12/21        Carol De La Cruz, PT, DPT  Physical Therapist

## 2021-07-19 ENCOUNTER — OFFICE VISIT (OUTPATIENT)
Dept: ORTHOPEDIC SURGERY | Facility: CLINIC | Age: 26
End: 2021-07-19

## 2021-07-19 ENCOUNTER — LAB (OUTPATIENT)
Dept: LAB | Facility: HOSPITAL | Age: 26
End: 2021-07-19

## 2021-07-19 VITALS
HEART RATE: 81 BPM | DIASTOLIC BLOOD PRESSURE: 78 MMHG | SYSTOLIC BLOOD PRESSURE: 117 MMHG | WEIGHT: 207 LBS | HEIGHT: 62 IN | BODY MASS INDEX: 38.09 KG/M2

## 2021-07-19 DIAGNOSIS — M67.959 TENDINOPATHY OF GLUTEAL REGION: ICD-10-CM

## 2021-07-19 DIAGNOSIS — M25.50 POLYARTHRALGIA: ICD-10-CM

## 2021-07-19 DIAGNOSIS — R53.83 FATIGUE, UNSPECIFIED TYPE: ICD-10-CM

## 2021-07-19 DIAGNOSIS — M67.959 TENDINOPATHY OF GLUTEAL REGION: Primary | ICD-10-CM

## 2021-07-19 PROCEDURE — 36415 COLL VENOUS BLD VENIPUNCTURE: CPT

## 2021-07-19 PROCEDURE — 86235 NUCLEAR ANTIGEN ANTIBODY: CPT

## 2021-07-19 PROCEDURE — 86200 CCP ANTIBODY: CPT

## 2021-07-19 PROCEDURE — 86140 C-REACTIVE PROTEIN: CPT

## 2021-07-19 PROCEDURE — 85652 RBC SED RATE AUTOMATED: CPT

## 2021-07-19 PROCEDURE — 86225 DNA ANTIBODY NATIVE: CPT

## 2021-07-19 PROCEDURE — 86038 ANTINUCLEAR ANTIBODIES: CPT

## 2021-07-19 PROCEDURE — 99214 OFFICE O/P EST MOD 30 MIN: CPT | Performed by: FAMILY MEDICINE

## 2021-07-19 PROCEDURE — 86431 RHEUMATOID FACTOR QUANT: CPT

## 2021-07-19 NOTE — PATIENT INSTRUCTIONS
Piriformis Syndrome Rehab  Ask your health care provider which exercises are safe for you. Do exercises exactly as told by your health care provider and adjust them as directed. It is normal to feel mild stretching, pulling, tightness, or discomfort as you do these exercises. Stop right away if you feel sudden pain or your pain gets worse. Do not begin these exercises until told by your health care provider.  Stretching and range-of-motion exercises  These exercises warm up your muscles and joints and improve the movement and flexibility of your hip and pelvis. The exercises also help to relieve pain, numbness, and tingling.  Hip rotation  This is an exercise in which you lie on your back and stretch the muscles that rotate your hip (hip rotators) to stretch your buttocks.  1. Lie on your back on a firm surface.  2. Pull your left / right knee toward your same shoulder with your left / right hand until your knee is pointing toward the ceiling. Hold your left / right ankle with your other hand.  3. Keeping your knee steady, gently pull your left / right ankle toward your other shoulder until you feel a stretch in your buttocks.  4. Hold this position for __________ seconds.  Repeat __________ times. Complete this exercise __________ times a day.  Hip extensor  This is an exercise in which you lie on your back and pull your knee to your chest.  1. Lie on your back on a firm surface. Both of your legs should be straight.  2. Pull your left / right knee to your chest. Hold your leg in this position by holding onto the back of your thigh or the front of your knee.  3. Hold this position for __________ seconds.  4. Slowly return to the starting position.  Repeat __________ times. Complete this exercise __________ times a day.  Strengthening exercises  These exercises build strength and endurance in your hip and thigh muscles. Endurance is the ability to use your muscles for a long time, even after they get  tired.  Straight leg raises, side-lying  This exercise strengthens the muscles that rotate the leg at the hip and move it away from your body (hip abductors).  1. Lie on your side with your left / right leg in the top position. Lie so your head, shoulder, knee, and hip line up. Bend your bottom knee to help you balance.  2. Lift your top leg 4-6 inches (10-15 cm) while keeping your toes pointed straight ahead.  3. Hold this position for __________ seconds.  4. Slowly lower your leg to the starting position.  5. Let your muscles relax completely after each repetition.  Repeat __________ times. Complete this exercise __________ times a day.  Hip abduction and rotation  This is sometimes called quadruped (on hands and knees) exercises.  1. Get on your hands and knees on a firm, lightly padded surface. Your hands should be directly below your shoulders, and your knees should be directly below your hips.  2. Lift your left / right knee out to the side. Keep your knee bent. Do not twist your body.  3. Hold this position for __________ seconds.  4. Slowly lower your leg.  Repeat __________ times. Complete this exercise __________ times a day.  Straight leg raises, face-down  This exercise stretches the muscles that move your hips away from the front of the pelvis (hip extensors).  1. Lie on your abdomen on a bed or a firm surface with a pillow under your hips.  2. Squeeze your buttocks muscles and lift your left / right leg about 4-6 inches (10-15 cm) off the bed. Do not let your back arch.  3. Hold this position for __________ seconds.  4. Slowly lower your leg to the starting position.  5. Let your muscles relax completely after each repetition.  Repeat __________ times. Complete this exercise __________ times a day.  This information is not intended to replace advice given to you by your health care provider. Make sure you discuss any questions you have with your health care provider.  Document Revised: 04/09/2020  Document Reviewed: 10/10/2019  [employer] Patient Education © 2021 [employer]    Piriformis Syndrome Rehab  Ask your health care provider which exercises are safe for you. Do exercises exactly as told by your health care provider and adjust them as directed. It is normal to feel mild stretching, pulling, tightness, or discomfort as you do these exercises. Stop right away if you feel sudden pain or your pain gets worse. Do not begin these exercises until told by your health care provider.  Stretching and range-of-motion exercises  These exercises warm up your muscles and joints and improve the movement and flexibility of your hip and pelvis. The exercises also help to relieve pain, numbness, and tingling.  Hip rotation  This is an exercise in which you lie on your back and stretch the muscles that rotate your hip (hip rotators) to stretch your buttocks.  5. Lie on your back on a firm surface.  6. Pull your left / right knee toward your same shoulder with your left / right hand until your knee is pointing toward the ceiling. Hold your left / right ankle with your other hand.  7. Keeping your knee steady, gently pull your left / right ankle toward your other shoulder until you feel a stretch in your buttocks.  8. Hold this position for __________ seconds.  Repeat __________ times. Complete this exercise __________ times a day.  Hip extensor  This is an exercise in which you lie on your back and pull your knee to your chest.  5. Lie on your back on a firm surface. Both of your legs should be straight.  6. Pull your left / right knee to your chest. Hold your leg in this position by holding onto the back of your thigh or the front of your knee.  7. Hold this position for __________ seconds.  8. Slowly return to the starting position.  Repeat __________ times. Complete this exercise __________ times a day.  Strengthening exercises  These exercises build strength and endurance in your hip and thigh muscles. Endurance is  the ability to use your muscles for a long time, even after they get tired.  Straight leg raises, side-lying  This exercise strengthens the muscles that rotate the leg at the hip and move it away from your body (hip abductors).  6. Lie on your side with your left / right leg in the top position. Lie so your head, shoulder, knee, and hip line up. Bend your bottom knee to help you balance.  7. Lift your top leg 4-6 inches (10-15 cm) while keeping your toes pointed straight ahead.  8. Hold this position for __________ seconds.  9. Slowly lower your leg to the starting position.  10. Let your muscles relax completely after each repetition.  Repeat __________ times. Complete this exercise __________ times a day.  Hip abduction and rotation  This is sometimes called quadruped (on hands and knees) exercises.  5. Get on your hands and knees on a firm, lightly padded surface. Your hands should be directly below your shoulders, and your knees should be directly below your hips.  6. Lift your left / right knee out to the side. Keep your knee bent. Do not twist your body.  7. Hold this position for __________ seconds.  8. Slowly lower your leg.  Repeat __________ times. Complete this exercise __________ times a day.  Straight leg raises, face-down  This exercise stretches the muscles that move your hips away from the front of the pelvis (hip extensors).  6. Lie on your abdomen on a bed or a firm surface with a pillow under your hips.  7. Squeeze your buttocks muscles and lift your left / right leg about 4-6 inches (10-15 cm) off the bed. Do not let your back arch.  8. Hold this position for __________ seconds.  9. Slowly lower your leg to the starting position.  10. Let your muscles relax completely after each repetition.  Repeat __________ times. Complete this exercise __________ times a day.  This information is not intended to replace advice given to you by your health care provider. Make sure you discuss any questions you  have with your health care provider.  Document Revised: 04/09/2020 Document Reviewed: 10/10/2019  [employer] Patient Education © 2021 [employer]

## 2021-07-19 NOTE — PROGRESS NOTES
"Primary Care Sports Medicine Office Visit Note     Patient ID: Tangela Rodarte is a 25 y.o. female.    Chief Complaint:  Chief Complaint   Patient presents with   • Right Knee - Follow-up     HPI:    Ms. Tangela Rodarte is a 25 y.o. female who presents to the clinic today for follow up evaluation of R knee pain. Both knees hurt mildly but R>L. She describes pain with deep flexion activity, and points to the medial retinaculum. Pain radiates from hip to knee, down IT distribution. Pain in musculature of her buttock as well.  She also has occasional and often other joint pains.  Multiple knuckles of her fingers, which seem to be \"enlarging\".  She has had pain in both shoulders, both hips, and her low back as well.  She questions a systemic illness today.    Past Medical History:   Diagnosis Date   • History of anemia as a child    • History of heart murmur in childhood    • Psoriasis    • Psoriasis    • Seborrhea    • Seizures (CMS/HCC)        Past Surgical History:   Procedure Laterality Date   • TONSILLECTOMY     • WISDOM TOOTH EXTRACTION         Family History   Problem Relation Age of Onset   • Diabetes Father    • Hypertension Father    • Cancer Father    • Kidney disease Father    • Diabetes Paternal Grandmother    • Arthritis Paternal Grandmother    • Stroke Paternal Grandfather    • Diabetes Paternal Grandfather      Social History     Occupational History   • Not on file   Tobacco Use   • Smoking status: Never Smoker   • Smokeless tobacco: Never Used   Substance and Sexual Activity   • Alcohol use: Yes     Comment: SOCIAL   • Drug use: No   • Sexual activity: Not on file      Review of Systems   Constitutional: Negative for activity change and fever.   Musculoskeletal: Positive for arthralgias.   Skin: Negative for color change and rash.   Neurological: Negative for weakness.     Objective:    /78   Pulse 81   Ht 157.5 cm (62\")   Wt 93.9 kg (207 lb)   BMI 37.86 kg/m²     Physical " Examination:  Physical Exam  Vitals and nursing note reviewed.   Constitutional:       General: She is not in acute distress.     Appearance: She is well-developed. She is not diaphoretic.   HENT:      Head: Normocephalic and atraumatic.   Eyes:      Conjunctiva/sclera: Conjunctivae normal.   Pulmonary:      Effort: Pulmonary effort is normal. No respiratory distress.   Musculoskeletal:      Right knee: No effusion.      Instability Tests: Medial Rohit test negative and lateral Rohit test negative.   Skin:     General: Skin is warm.      Capillary Refill: Capillary refill takes less than 2 seconds.   Neurological:      Mental Status: She is alert.       Right Ankle Exam     Range of Motion   The patient has normal right ankle ROM.      Left Ankle Exam     Range of Motion   The patient has normal left ankle ROM.       Right Knee Exam     Muscle Strength   The patient has normal right knee strength.    Tenderness   Right knee tenderness location: Quad tendon, IT band.    Range of Motion   The patient has normal right knee ROM.  Extension: normal   Flexion: normal     Tests   Rohit:  Medial - negative Lateral - negative  Varus: negative Valgus: negative  Lachman:  Anterior - negative      Drawer:  Anterior - negative    Posterior - negative    Other   Erythema: absent  Sensation: normal (Distal to the knee. DP and PT palpable. )  Pulse: present  Swelling: none  Effusion: no effusion present      Right Hip Exam     Tenderness   The patient is experiencing tenderness in the greater trochanter.    Range of Motion   The patient has normal right hip ROM.    Muscle Strength   Abduction: 5/5         Imaging and other tests:  No new imaging today.    Assessment and Plan:    1. Tendinopathy of gluteal region    2. Fatigue, unspecified type    3. Polyarthralgia  - Sedimentation Rate; Future  - C-reactive Protein; Future  - Cyclic Citrul Peptide Antibody, IgG / IgA; Future  - KAM by IFA, Reflex 9-biomarkers profile;  "Future  - Rheumatoid Factor, Quant; Future    I discussed with the patient pathology and treatment options.  I would like for her to continue exercise program, and continue/restart meloxicam that she was given previously for knee pain.  With polyarthralgias, will start work-up.  We will get ESR, CRP, CCP, KAM, and rheumatoid factor today.  Will call patient with results.  RTC for further treatment options as necessary.    Kenny LOMBARDI \"Chance\" Jalen CORONA, , CAQSM  07/19/21  16:45 EDT    Disclaimer: Please note that areas of this note were completed with computer voice recognition software.  Quite often unanticipated grammatical, syntax, homophones, and other interpretive errors are inadvertently transcribed by the computer software. Please excuse any errors that have escaped final proofreading.  "

## 2021-07-20 DIAGNOSIS — M22.2X1 PATELLOFEMORAL DISORDER OF RIGHT KNEE: ICD-10-CM

## 2021-07-20 LAB
CHROMATIN AB SERPL-ACNC: <10 IU/ML (ref 0–14)
CRP SERPL-MCNC: 2.51 MG/DL (ref 0–0.5)
ERYTHROCYTE [SEDIMENTATION RATE] IN BLOOD: 40 MM/HR (ref 0–20)

## 2021-07-20 RX ORDER — MELOXICAM 15 MG/1
15 TABLET ORAL DAILY PRN
Qty: 90 TABLET | Refills: 0 | Status: SHIPPED | OUTPATIENT
Start: 2021-07-20 | End: 2021-08-02

## 2021-07-21 LAB
ANA SPECKLED TITR SER: ABNORMAL {TITER}
ANA TITR SER IF: POSITIVE {TITER}
CCP IGA+IGG SERPL IA-ACNC: 6 UNITS (ref 0–19)
CENTROMERE B AB SER-ACNC: <0.2 AI (ref 0–0.9)
CHROMATIN AB SERPL-ACNC: 0.4 AI (ref 0–0.9)
DSDNA AB SER-ACNC: 1 IU/ML (ref 0–9)
ENA JO1 AB SER-ACNC: <0.2 AI (ref 0–0.9)
ENA RNP AB SER-ACNC: <0.2 AI (ref 0–0.9)
ENA SCL70 AB SER-ACNC: 0.2 AI (ref 0–0.9)
ENA SM AB SER-ACNC: <0.2 AI (ref 0–0.9)
ENA SS-A AB SER-ACNC: <0.2 AI (ref 0–0.9)
ENA SS-B AB SER-ACNC: <0.2 AI (ref 0–0.9)
LABORATORY COMMENT REPORT: ABNORMAL
Lab: ABNORMAL
Lab: ABNORMAL

## 2021-08-02 ENCOUNTER — OFFICE VISIT (OUTPATIENT)
Dept: ORTHOPEDIC SURGERY | Facility: CLINIC | Age: 26
End: 2021-08-02

## 2021-08-02 VITALS
WEIGHT: 208 LBS | BODY MASS INDEX: 38.28 KG/M2 | HEIGHT: 62 IN | DIASTOLIC BLOOD PRESSURE: 80 MMHG | SYSTOLIC BLOOD PRESSURE: 120 MMHG | HEART RATE: 84 BPM

## 2021-08-02 DIAGNOSIS — D89.89 AUTOIMMUNE DISORDER (HCC): ICD-10-CM

## 2021-08-02 DIAGNOSIS — M22.2X1 PATELLOFEMORAL SYNDROME OF BOTH KNEES: ICD-10-CM

## 2021-08-02 DIAGNOSIS — M22.2X1 PATELLOFEMORAL SYNDROME OF BOTH KNEES: Primary | ICD-10-CM

## 2021-08-02 DIAGNOSIS — M22.2X2 PATELLOFEMORAL SYNDROME OF BOTH KNEES: Primary | ICD-10-CM

## 2021-08-02 DIAGNOSIS — M22.2X2 PATELLOFEMORAL SYNDROME OF BOTH KNEES: ICD-10-CM

## 2021-08-02 PROCEDURE — 99214 OFFICE O/P EST MOD 30 MIN: CPT | Performed by: FAMILY MEDICINE

## 2021-08-02 RX ORDER — CELECOXIB 100 MG/1
100 CAPSULE ORAL 2 TIMES DAILY
Qty: 60 CAPSULE | Refills: 1 | Status: SHIPPED | OUTPATIENT
Start: 2021-08-02 | End: 2021-08-02

## 2021-08-02 RX ORDER — CELECOXIB 100 MG/1
CAPSULE ORAL
Qty: 180 CAPSULE | Refills: 0 | Status: SHIPPED | OUTPATIENT
Start: 2021-08-02 | End: 2021-08-09

## 2021-08-02 NOTE — PROGRESS NOTES
"Primary Care Sports Medicine Office Visit Note     Patient ID: Tangela oRdarte is a 25 y.o. female.    Chief Complaint:  Chief Complaint   Patient presents with   • Left Knee - Follow-up   • Right Knee - Follow-up     HPI:    Ms. Tangela Rodarte is a 25 y.o. female who presents to the clinic today for follow-up evaluation of patellofemoral syndrome of bilateral knees.  At previous visit we also discussed polyarthralgia, and labs were ordered.  She would like to discuss lab results today as well.    Past Medical History:   Diagnosis Date   • History of anemia as a child    • History of heart murmur in childhood    • Psoriasis    • Psoriasis    • Seborrhea    • Seizures (CMS/HCC)        Past Surgical History:   Procedure Laterality Date   • TONSILLECTOMY     • WISDOM TOOTH EXTRACTION         Family History   Problem Relation Age of Onset   • Diabetes Father    • Hypertension Father    • Cancer Father    • Kidney disease Father    • Diabetes Paternal Grandmother    • Arthritis Paternal Grandmother    • Stroke Paternal Grandfather    • Diabetes Paternal Grandfather      Social History     Occupational History   • Not on file   Tobacco Use   • Smoking status: Never Smoker   • Smokeless tobacco: Never Used   Substance and Sexual Activity   • Alcohol use: Yes     Comment: SOCIAL   • Drug use: No   • Sexual activity: Not on file      Review of Systems   Constitutional: Negative for activity change and fever.   Musculoskeletal: Positive for arthralgias.   Skin: Negative for color change and rash.   Neurological: Negative for weakness.     Objective:    /80   Pulse 84   Ht 157.5 cm (62\")   Wt 94.3 kg (208 lb)   BMI 38.04 kg/m²     Physical Examination:  Physical Exam  Vitals and nursing note reviewed.   Constitutional:       General: She is not in acute distress.     Appearance: She is well-developed. She is not diaphoretic.   HENT:      Head: Normocephalic and atraumatic.   Eyes:      Conjunctiva/sclera: " "Conjunctivae normal.   Pulmonary:      Effort: Pulmonary effort is normal. No respiratory distress.   Skin:     General: Skin is warm.      Capillary Refill: Capillary refill takes less than 2 seconds.   Neurological:      Mental Status: She is alert.       Right Hand Exam     Comments:  Moderate enlargement/nodules of third and fourth DIP and PIP joints.  Tender to palpation.  No erythema.          Imaging and other tests:  No new imaging today.     Assessment and Plan:    1. Patellofemoral syndrome of both knees    2. Autoimmune disorder (CMS/[facility])  - Ambulatory Referral to Rheumatology    As far as her knees are concerned, I feel we stay the course.  Will change anti-inflammatory to Celebrex, and continue exercises that she learned in physical therapy, which should continue to help alleviate this issue.      Otherwise, we also discussed positive KAM test, with speckled pattern.  I would like for her to see rheumatology for further quantification and treatment options of autoimmune disease.  A referral was placed today.    RTC to me as needed.    Kenny LOMBARDI \"Chance\" Jalen CORONA DO, CAQSM  08/03/21  08:31 EDT    Disclaimer: Please note that areas of this note were completed with computer voice recognition software.  Quite often unanticipated grammatical, syntax, homophones, and other interpretive errors are inadvertently transcribed by the computer software. Please excuse any errors that have escaped final proofreading.  "

## 2021-08-08 DIAGNOSIS — M22.2X1 PATELLOFEMORAL SYNDROME OF BOTH KNEES: ICD-10-CM

## 2021-08-08 DIAGNOSIS — M22.2X2 PATELLOFEMORAL SYNDROME OF BOTH KNEES: ICD-10-CM

## 2021-08-09 RX ORDER — CELECOXIB 100 MG/1
CAPSULE ORAL
Qty: 180 CAPSULE | Refills: 0 | Status: SHIPPED | OUTPATIENT
Start: 2021-08-09 | End: 2021-08-18 | Stop reason: SDUPTHER

## 2021-08-13 ENCOUNTER — TELEPHONE (OUTPATIENT)
Dept: ORTHOPEDIC SURGERY | Facility: CLINIC | Age: 26
End: 2021-08-13

## 2021-08-13 NOTE — TELEPHONE ENCOUNTER
I SPOKE TO PATIENT AND GAVE [phone] TO CALL FOR APPOINTMENT.  SHE STATED THAT IS THE NUMBER SHE HAS TRIED A FEW TIMES AND IT IS A NON WORKING NUMBER.  I GOOGLED [facility]AT[facility] AND CALLED HER BACK AND LEFT A MESSAGE TO CALL [phone] PER WEBSITE.

## 2021-08-13 NOTE — TELEPHONE ENCOUNTER
PATIENT CALLED INTO THE OFFICE TO STATE NO ONE HAS CONTACTED HER ABOUT A RHEUMATOLOGY APPT.  I TOLD HR I WOULD LOOK INTO THIS AND CALL HER BACK.  [phone]

## 2021-08-16 ENCOUNTER — TELEPHONE (OUTPATIENT)
Dept: ORTHOPEDIC SURGERY | Facility: CLINIC | Age: 26
End: 2021-08-16

## 2021-08-16 NOTE — TELEPHONE ENCOUNTER
Caller: PATIENT    Relationship to patient: SELF     Best call back number: [phone]    Patient is needing: PATIENT WAS CALLING TO REQUEST HER RHEUMATOLOGY  REFERRAL BE SENT TO  RHEUMATOLOGY. PATIENT STATED THE REFERRAL WAS TO:    [place] [address]  PHONE NUMBER: [phone]    PATIENT STATED SHE CALLED THEIR OFFICE TO SCHEDULE AN APPT BUT THEY STATED THEY NEVER RECEIVED PATIENT'S REFERRAL. PLEASE ADVISE.

## 2021-08-17 DIAGNOSIS — M22.2X1 PATELLOFEMORAL PAIN SYNDROME OF BOTH KNEES: ICD-10-CM

## 2021-08-17 DIAGNOSIS — M22.2X2 PATELLOFEMORAL PAIN SYNDROME OF BOTH KNEES: ICD-10-CM

## 2021-08-18 RX ORDER — MELOXICAM 15 MG/1
15 TABLET ORAL DAILY
Qty: 90 TABLET | Refills: 0 | Status: SHIPPED | OUTPATIENT
Start: 2021-08-18 | End: 2021-10-05

## 2021-10-05 ENCOUNTER — OFFICE VISIT (OUTPATIENT)
Dept: FAMILY MEDICINE CLINIC | Facility: CLINIC | Age: 26
End: 2021-10-05

## 2021-10-05 VITALS
SYSTOLIC BLOOD PRESSURE: 103 MMHG | OXYGEN SATURATION: 99 % | HEART RATE: 76 BPM | WEIGHT: 209 LBS | RESPIRATION RATE: 16 BRPM | HEIGHT: 62 IN | BODY MASS INDEX: 38.46 KG/M2 | TEMPERATURE: 97.1 F | DIASTOLIC BLOOD PRESSURE: 69 MMHG

## 2021-10-05 DIAGNOSIS — Z23 NEED FOR INFLUENZA VACCINATION: ICD-10-CM

## 2021-10-05 DIAGNOSIS — L21.9 SEBORRHEA: ICD-10-CM

## 2021-10-05 DIAGNOSIS — L40.9 PSORIASIS: ICD-10-CM

## 2021-10-05 DIAGNOSIS — M25.50 POLYARTHRALGIA: Primary | ICD-10-CM

## 2021-10-05 DIAGNOSIS — R56.9 SEIZURE (HCC): ICD-10-CM

## 2021-10-05 PROBLEM — D64.9 ANEMIA: Status: ACTIVE | Noted: 2021-10-05

## 2021-10-05 PROCEDURE — 90686 IIV4 VACC NO PRSV 0.5 ML IM: CPT | Performed by: FAMILY MEDICINE

## 2021-10-05 PROCEDURE — 99203 OFFICE O/P NEW LOW 30 MIN: CPT | Performed by: FAMILY MEDICINE

## 2021-10-05 PROCEDURE — 90471 IMMUNIZATION ADMIN: CPT | Performed by: FAMILY MEDICINE

## 2021-10-05 RX ORDER — CELECOXIB 100 MG/1
100 CAPSULE ORAL 2 TIMES DAILY PRN
COMMUNITY

## 2021-10-05 RX ORDER — LEVETIRACETAM 500 MG/1
1000 TABLET, EXTENDED RELEASE ORAL NIGHTLY
Qty: 180 TABLET | Refills: 1 | Status: SHIPPED | OUTPATIENT
Start: 2021-10-05 | End: 2022-03-25 | Stop reason: SDUPTHER

## 2021-10-05 RX ORDER — TRIAMCINOLONE ACETONIDE 1 MG/G
1 CREAM TOPICAL 2 TIMES DAILY PRN
Qty: 80 G | Refills: 0 | Status: SHIPPED | OUTPATIENT
Start: 2021-10-05

## 2021-10-05 RX ORDER — CLOBETASOL PROPIONATE 0.05 G/100ML
SHAMPOO TOPICAL DAILY
Qty: 118 ML | Refills: 5 | Status: SHIPPED | OUTPATIENT
Start: 2021-10-05

## 2021-10-05 RX ORDER — TOPIRAMATE 50 MG/1
50 TABLET, FILM COATED ORAL NIGHTLY
Qty: 90 TABLET | Refills: 1 | Status: SHIPPED | OUTPATIENT
Start: 2021-10-05 | End: 2022-01-06

## 2021-10-05 NOTE — PROGRESS NOTES
Subjective   Tangela Rodarte is a 26 y.o. female.     Chief Complaint   Patient presents with   • Establish Care   • Arthritis         Current Outpatient Medications:   •  celecoxib (CeleBREX) 100 MG capsule, Take 100 mg by mouth 2 (Two) Times a Day As Needed for Mild Pain ., Disp: , Rfl:   •  clobetasol propionate (CLOBEX) 0.05 % shampoo, Apply  topically to the appropriate area as directed Daily., Disp: 118 mL, Rfl: 5  •  Ferrous Sulfate (IRON PO), Take 1 tablet by mouth Daily., Disp: , Rfl:   •  levETIRAcetam XR (KEPPRA XR) 500 MG 24 hr tablet, Take 2 tablets by mouth Every Night., Disp: 180 tablet, Rfl: 1  •  Norethin-Eth Estradiol-Fe 0.8-25 MG-MCG chewable tablet, , Disp: , Rfl:   •  topiramate (TOPAMAX) 50 MG tablet, Take 1 tablet by mouth Every Night., Disp: 90 tablet, Rfl: 1  •  triamcinolone (KENALOG) 0.1 % cream, Apply 1 application topically to the appropriate area as directed 2 (Two) Times a Day As Needed for Rash., Disp: 80 g, Rfl: 0    Past Medical History:   Diagnosis Date   • Anemia 10/5/2021   • PAP     NEG = 2021   JFW   • Psoriasis    • Seborrhea    • Seizures        Past Surgical History:   Procedure Laterality Date   • TONSILLECTOMY     • WISDOM TOOTH EXTRACTION         Family History   Problem Relation Age of Onset   • No Known Problems Mother    • Diabetes Father    • Hypertension Father    • Cancer Father         Kidney Cancer   • Kidney disease Father    • Arthritis Father    • Obesity Father    • Diabetes Paternal Grandmother    • Arthritis Paternal Grandmother    • Stroke Paternal Grandfather    • Diabetes Paternal Grandfather        Social History     Socioeconomic History   • Marital status:    Tobacco Use   • Smoking status: Never Smoker   • Smokeless tobacco: Never Used   Vaping Use   • Vaping Use: Never used   Substance and Sexual Activity   • Alcohol use: Yes     Comment: OCC   • Drug use: No   • Sexual activity: Defer       25 y/o C female here to est care w/ c/o's OA and  hx/o Sz/ Anemia/ Psoriasis/ seborrhea     Pt is not having redness or swelling of her joints, but having some dull aches/ pains and the meloxicam is not working       The following portions of the patient's history were reviewed and updated as appropriate: allergies, current medications, past family history, past medical history, past social history, past surgical history and problem list.    Review of Systems   Constitutional: Negative for activity change, appetite change, unexpected weight gain and unexpected weight loss.   Gastrointestinal: Negative for constipation, diarrhea, nausea, vomiting and GERD.   Musculoskeletal: Positive for arthralgias. Negative for joint swelling.   Skin: Negative for bruise.   Neurological: Negative for tremors, seizures, weakness and numbness.       Vitals:    10/05/21 1417   BP: 103/69   Pulse: 76   Resp: 16   Temp: 97.1 °F (36.2 °C)   SpO2: 99%       Objective   Physical Exam  Vitals and nursing note reviewed.   Constitutional:       General: She is not in acute distress.     Appearance: She is well-developed. She is not ill-appearing or toxic-appearing.   HENT:      Head: Normocephalic and atraumatic.   Cardiovascular:      Rate and Rhythm: Normal rate and regular rhythm.      Heart sounds: Normal heart sounds. No murmur heard.      Pulmonary:      Effort: Pulmonary effort is normal. No respiratory distress.      Breath sounds: Normal breath sounds. No stridor. No wheezing, rhonchi or rales.   Musculoskeletal:      Cervical back: Normal range of motion and neck supple.   Skin:     General: Skin is warm and dry.      Findings: No rash.   Neurological:      Mental Status: She is alert and oriented to person, place, and time.      Cranial Nerves: No cranial nerve deficit.   Psychiatric:         Behavior: Behavior normal.         Thought Content: Thought content normal.         Judgment: Judgment normal.           Assessment/Plan   Diagnoses and all orders for this visit:    1.  Polyarthralgia (Primary)    2. Psoriasis    3. Seizure (HCC)    4. Seborrhea    5. Need for influenza vaccination  -     FluLaval/Fluarix (VFC) >6 Months    Other orders  -     levETIRAcetam XR (KEPPRA XR) 500 MG 24 hr tablet; Take 2 tablets by mouth Every Night.  Dispense: 180 tablet; Refill: 1  -     topiramate (TOPAMAX) 50 MG tablet; Take 1 tablet by mouth Every Night.  Dispense: 90 tablet; Refill: 1  -     clobetasol propionate (CLOBEX) 0.05 % shampoo; Apply  topically to the appropriate area as directed Daily.  Dispense: 118 mL; Refill: 5  -     triamcinolone (KENALOG) 0.1 % cream; Apply 1 application topically to the appropriate area as directed 2 (Two) Times a Day As Needed for Rash.  Dispense: 80 g; Refill: 0    flu shot today  Med refills  Trial of celebrex prn

## 2021-10-15 ENCOUNTER — PATIENT ROUNDING (BHMG ONLY) (OUTPATIENT)
Dept: FAMILY MEDICINE CLINIC | Facility: CLINIC | Age: 26
End: 2021-10-15

## 2021-10-15 NOTE — PROGRESS NOTES
October 15, 2021    Hello, may I speak with Tangela Rodarte?    My name is Sameer    I am  with [facility]  [address]  [place] [address].    M asking the following:    I am calling to officially welcome you to our practice and ask about your recent visit.   Tell me about your visit with us. What things went well?         We're always looking for ways to make our patients' experiences even better. Do you have recommendations on ways we may improve?      Overall were you satisfied with your first visit to our practice?     \  Thank you, and have a great day.    Left my name and number if she would like to call back and discuss or give feedback.

## 2022-01-06 RX ORDER — TOPIRAMATE 50 MG/1
50 TABLET, FILM COATED ORAL NIGHTLY
Qty: 90 TABLET | Refills: 0 | Status: SHIPPED | OUTPATIENT
Start: 2022-01-06 | End: 2022-03-25 | Stop reason: SDUPTHER

## 2022-01-06 NOTE — TELEPHONE ENCOUNTER
Rx Refill Note  Requested Prescriptions     Pending Prescriptions Disp Refills   • topiramate (TOPAMAX) 50 MG tablet [Pharmacy Med Name: TOPIRAMATE 50MG TABLETS] 90 tablet 1     Sig: TAKE 1 TABLET BY MOUTH EVERY NIGHT      Last office visit with prescribing clinician: 10/5/2021      Next office visit with prescribing clinician: Visit date not found     SCANNED - LABS (09/16/2021)         Gunjan Cavanaugh, RT  01/06/22, 09:10 EST

## 2022-03-25 ENCOUNTER — OFFICE VISIT (OUTPATIENT)
Dept: FAMILY MEDICINE CLINIC | Facility: CLINIC | Age: 27
End: 2022-03-25

## 2022-03-25 VITALS
HEIGHT: 62 IN | BODY MASS INDEX: 42.88 KG/M2 | SYSTOLIC BLOOD PRESSURE: 103 MMHG | RESPIRATION RATE: 16 BRPM | WEIGHT: 233 LBS | TEMPERATURE: 98.2 F | HEART RATE: 65 BPM | OXYGEN SATURATION: 100 % | DIASTOLIC BLOOD PRESSURE: 72 MMHG

## 2022-03-25 DIAGNOSIS — Z00.00 ANNUAL PHYSICAL EXAM: Primary | ICD-10-CM

## 2022-03-25 DIAGNOSIS — Z13.220 ENCOUNTER FOR SCREENING FOR LIPID DISORDER: ICD-10-CM

## 2022-03-25 DIAGNOSIS — L40.50 PSORIATIC ARTHRITIS: ICD-10-CM

## 2022-03-25 DIAGNOSIS — L03.032 PARONYCHIA OF GREAT TOE, LEFT: ICD-10-CM

## 2022-03-25 DIAGNOSIS — R56.9 SEIZURE: ICD-10-CM

## 2022-03-25 LAB
BILIRUB BLD-MCNC: NEGATIVE MG/DL
CLARITY, POC: CLEAR
COLOR UR: YELLOW
EXPIRATION DATE: NORMAL
GLUCOSE UR STRIP-MCNC: NEGATIVE MG/DL
KETONES UR QL: NEGATIVE
LEUKOCYTE EST, POC: NEGATIVE
Lab: NORMAL
NITRITE UR-MCNC: NEGATIVE MG/ML
PH UR: 5.5 [PH] (ref 5–8)
PROT UR STRIP-MCNC: NEGATIVE MG/DL
RBC # UR STRIP: NEGATIVE /UL
SP GR UR: 1.01 (ref 1–1.03)
UROBILINOGEN UR QL: NORMAL

## 2022-03-25 PROCEDURE — 81003 URINALYSIS AUTO W/O SCOPE: CPT | Performed by: FAMILY MEDICINE

## 2022-03-25 PROCEDURE — 99395 PREV VISIT EST AGE 18-39: CPT | Performed by: FAMILY MEDICINE

## 2022-03-25 RX ORDER — LEVETIRACETAM 500 MG/1
1000 TABLET, EXTENDED RELEASE ORAL NIGHTLY
Qty: 180 TABLET | Refills: 1 | Status: SHIPPED | OUTPATIENT
Start: 2022-03-25 | End: 2023-03-24 | Stop reason: SDUPTHER

## 2022-03-25 RX ORDER — ADALIMUMAB 40MG/0.4ML
KIT SUBCUTANEOUS
COMMUNITY
Start: 2022-03-03 | End: 2022-03-25

## 2022-03-25 RX ORDER — TOPIRAMATE 50 MG/1
50 TABLET, FILM COATED ORAL NIGHTLY
Qty: 90 TABLET | Refills: 1 | Status: SHIPPED | OUTPATIENT
Start: 2022-03-25 | End: 2022-08-29

## 2022-03-25 RX ORDER — ADALIMUMAB 40MG/0.4ML
40 KIT SUBCUTANEOUS
Qty: 2 EACH | Status: SHIPPED
Start: 2022-03-25

## 2022-03-25 RX ORDER — CEPHALEXIN 500 MG/1
500 CAPSULE ORAL 2 TIMES DAILY
Qty: 14 CAPSULE | Refills: 0 | Status: SHIPPED | OUTPATIENT
Start: 2022-03-25

## 2022-03-25 NOTE — PROGRESS NOTES
Subjective   Tangela Rodarte is a 26 y.o. female.     Chief Complaint   Patient presents with   • Annual Exam     Physical   • Med Refill     Refills on Topamax and Keppra to [facility] on [address]    • Seizures   • Psoriasis         Current Outpatient Medications:   •  clobetasol propionate (CLOBEX) 0.05 % shampoo, Apply  topically to the appropriate area as directed Daily., Disp: 118 mL, Rfl: 5  •  Ferrous Sulfate (IRON PO), Take 1 tablet by mouth Daily., Disp: , Rfl:   •  Humira Pen 40 MG/0.4ML Pen-injector Kit, 40 mg Every 14 (Fourteen) Days., Disp: 2 each, Rfl:   •  levETIRAcetam XR (KEPPRA XR) 500 MG 24 hr tablet, Take 2 tablets by mouth Every Night., Disp: 180 tablet, Rfl: 1  •  Norethin-Eth Estradiol-Fe 0.8-25 MG-MCG chewable tablet, 1 po qd, Disp: , Rfl:   •  topiramate (TOPAMAX) 50 MG tablet, Take 1 tablet by mouth Every Night., Disp: 90 tablet, Rfl: 1  •  triamcinolone (KENALOG) 0.1 % cream, Apply 1 application topically to the appropriate area as directed 2 (Two) Times a Day As Needed for Rash., Disp: 80 g, Rfl: 0  •  celecoxib (CeleBREX) 100 MG capsule, Take 100 mg by mouth 2 (Two) Times a Day As Needed for Mild Pain ., Disp: , Rfl:   •  cephalexin (Keflex) 500 MG capsule, Take 1 capsule by mouth 2 (Two) Times a Day., Disp: 14 capsule, Rfl: 0    Past Medical History:   Diagnosis Date   • Anemia 10/05/2021   • PAP     NEG = 2021/ 2022          JFW   • Psoriatic arthritis (HCC)    • Seborrhea    • Seizures        Past Surgical History:   Procedure Laterality Date   • TONSILLECTOMY     • WISDOM TOOTH EXTRACTION         Family History   Problem Relation Age of Onset   • No Known Problems Mother    • Diabetes Father    • Hypertension Father    • Cancer Father         Kidney Cancer   • Kidney disease Father    • Arthritis Father    • Obesity Father    • Diabetes Paternal Grandmother    • Arthritis Paternal Grandmother    • Stroke Paternal Grandfather    • Diabetes Paternal Grandfather        Social  History     Socioeconomic History   • Marital status:    Tobacco Use   • Smoking status: Never Smoker   • Smokeless tobacco: Never Used   Vaping Use   • Vaping Use: Never used   Substance and Sexual Activity   • Alcohol use: Yes     Comment: OCC   • Drug use: No   • Sexual activity: Defer       25 y/o C female here for annual PE w/o pap w/ hx/o Psoriatic Arthritis/ Sz/ Seborrheic dermatitis    Pt seeing Rheum and getting Humira shots for her Psoriatic arthritis w/ good results-----pt states she got a of labs in Sept 2021 and then usu  Pt sees derm for her skin issues and Gyne for her paps    Pt has an ingrown toenail that she thinks she finally fixed today----using otc neosporin/ warm water soaks; started about a week ago    Pt UTD on Covid/ flu shots       The following portions of the patient's history were reviewed and updated as appropriate: allergies, current medications, past family history, past medical history, past social history, past surgical history and problem list.    Review of Systems   Constitutional: Negative for activity change, appetite change, fatigue, unexpected weight gain and unexpected weight loss.   HENT: Negative for congestion, ear pain, hearing loss, nosebleeds, postnasal drip, rhinorrhea, sinus pressure, sneezing, sore throat, tinnitus and trouble swallowing.    Eyes: Negative for blurred vision and double vision.   Respiratory: Negative for cough and shortness of breath.    Cardiovascular: Negative for chest pain.   Gastrointestinal: Negative for abdominal pain, constipation, diarrhea, nausea, vomiting, GERD and indigestion.   Genitourinary: Negative for difficulty urinating, dysuria, flank pain, frequency, urgency and urinary incontinence.   Musculoskeletal: Negative for arthralgias and myalgias.   Skin: Positive for color change and wound. Negative for rash and skin lesions.   Neurological: Negative for seizures, weakness, memory problem and confusion.   Psychiatric/Behavioral:  Negative for agitation, self-injury, suicidal ideas, depressed mood and stress. The patient is not nervous/anxious.        Vitals:    03/25/22 1528   BP: 103/72   Pulse: 65   Resp: 16   Temp: 98.2 °F (36.8 °C)   SpO2: 100%       Objective   Physical Exam  Vitals and nursing note reviewed.   Constitutional:       General: She is not in acute distress.     Appearance: Normal appearance. She is well-developed. She is not ill-appearing, toxic-appearing or diaphoretic.   HENT:      Head: Normocephalic and atraumatic.      Right Ear: Tympanic membrane, ear canal and external ear normal. There is no impacted cerumen.      Left Ear: Tympanic membrane, ear canal and external ear normal. There is no impacted cerumen.      Nose: Nose normal. No congestion or rhinorrhea.      Mouth/Throat:      Mouth: Mucous membranes are moist.      Pharynx: No oropharyngeal exudate or posterior oropharyngeal erythema.   Eyes:      Extraocular Movements: Extraocular movements intact.      Conjunctiva/sclera: Conjunctivae normal.      Pupils: Pupils are equal, round, and reactive to light.   Cardiovascular:      Rate and Rhythm: Normal rate and regular rhythm.      Heart sounds: Normal heart sounds. No murmur heard.  Pulmonary:      Effort: Pulmonary effort is normal. No respiratory distress.      Breath sounds: Normal breath sounds. No wheezing.   Abdominal:      General: Bowel sounds are normal. There is no distension.      Palpations: Abdomen is soft. There is no mass.      Tenderness: There is no abdominal tenderness. There is no guarding or rebound.      Hernia: No hernia is present.   Musculoskeletal:         General: Normal range of motion.      Cervical back: Normal range of motion and neck supple.      Right lower leg: No edema.      Left lower leg: No edema.   Feet:      Left foot:      Skin integrity: No erythema.      Toenail Condition: Left toenails are ingrown.      Comments: Left grt toe w/ increased swelling at medial nail  edge  Lymphadenopathy:      Cervical: No cervical adenopathy.   Skin:     General: Skin is warm and dry.      Findings: No rash.   Neurological:      General: No focal deficit present.      Mental Status: She is alert and oriented to person, place, and time.      Cranial Nerves: No cranial nerve deficit.   Psychiatric:         Attention and Perception: Attention and perception normal.         Mood and Affect: Mood and affect normal.         Speech: Speech normal.         Behavior: Behavior normal. Behavior is cooperative.         Thought Content: Thought content normal.         Cognition and Memory: Cognition and memory normal.         Judgment: Judgment normal.           Assessment/Plan   Diagnoses and all orders for this visit:    1. Annual physical exam (Primary)  -     POC Urinalysis Dipstick, Automated    2. Paronychia of great toe, left    3. Seizure (HCC)  -     Levetiracetam Level (Keppra); Future    4. Psoriatic arthritis (HCC)    5. Encounter for screening for lipid disorder  -     Lipid Panel; Future    Other orders  -     Humira Pen 40 MG/0.4ML Pen-injector Kit; 40 mg Every 14 (Fourteen) Days.  Dispense: 2 each  -     levETIRAcetam XR (KEPPRA XR) 500 MG 24 hr tablet; Take 2 tablets by mouth Every Night.  Dispense: 180 tablet; Refill: 1  -     topiramate (TOPAMAX) 50 MG tablet; Take 1 tablet by mouth Every Night.  Dispense: 90 tablet; Refill: 1  -     cephalexin (Keflex) 500 MG capsule; Take 1 capsule by mouth 2 (Two) Times a Day.  Dispense: 14 capsule; Refill: 0      Rx---Keflex x 1 wk  Med refills  Reviewed Rheum labs from Sept and requested most recent Rheum labs

## 2022-04-08 ENCOUNTER — TELEPHONE (OUTPATIENT)
Dept: FAMILY MEDICINE CLINIC | Facility: CLINIC | Age: 27
End: 2022-04-08

## 2022-04-08 NOTE — TELEPHONE ENCOUNTER
Caller: Tangela Rodarte    Relationship to patient: Self    Best call back number: [phone]    Patient is needing: PT IS CALLING IN REGARDS TO PAPERWORK DROPPED OFF ON Tuesday FOR HER INSURANCE COMPANY. PLEASE CALL PT IF READY FOR

## 2022-04-08 NOTE — TELEPHONE ENCOUNTER
HUB to read    I called and left a message telling the patient the paperwork is ready for her to pickup and to come to the side vendor door to get it.     Paper has been copied and it the folder for .

## 2022-08-29 RX ORDER — TOPIRAMATE 50 MG/1
50 TABLET, FILM COATED ORAL NIGHTLY
Qty: 90 TABLET | Refills: 1 | Status: SHIPPED | OUTPATIENT
Start: 2022-08-29 | End: 2023-03-24 | Stop reason: SDUPTHER

## 2022-08-29 NOTE — TELEPHONE ENCOUNTER
Rx Refill Note  Requested Prescriptions     Pending Prescriptions Disp Refills   • topiramate (TOPAMAX) 50 MG tablet [Pharmacy Med Name: TOPIRAMATE 50MG TABLETS] 90 tablet 1     Sig: TAKE 1 TABLET BY MOUTH EVERY NIGHT      Last office visit with prescribing clinician: 3/25/2022      Next office visit with prescribing clinician: Visit date not found     SCANNED - LABS (12/21/2021)  Lipid Panel With LDL / HDL Ratio (08/18/2020 08:21)         Enedina Freitas CMA  08/29/22, 08:46 EDT

## 2023-03-24 RX ORDER — TOPIRAMATE 50 MG/1
50 TABLET, FILM COATED ORAL NIGHTLY
Qty: 90 TABLET | Refills: 0 | Status: SHIPPED | OUTPATIENT
Start: 2023-03-24

## 2023-03-24 RX ORDER — LEVETIRACETAM 500 MG/1
1000 TABLET, EXTENDED RELEASE ORAL NIGHTLY
Qty: 180 TABLET | Refills: 0 | Status: SHIPPED | OUTPATIENT
Start: 2023-03-24

## 2023-03-24 NOTE — TELEPHONE ENCOUNTER
JUAREZ ONCE A YEAR AT PHYSICAL, LAST ONE 03/25/22 BUT NEXT AVAILABLE ISN'T TILL 05/05/23     Caller: Rodarte Tangela M    Relationship: Self    Best call back number: [phone]    Requested Prescriptions:   Requested Prescriptions     Pending Prescriptions Disp Refills   • levETIRAcetam XR (KEPPRA XR) 500 MG 24 hr tablet 180 tablet 1     Sig: Take 2 tablets by mouth Every Night.   • topiramate (TOPAMAX) 50 MG tablet 90 tablet 1     Sig: Take 1 tablet by mouth Every Night.        Pharmacy where request should be sent: [facility] #[id number] [address] AT [address] [phone] [facility] [phone] [facility]     Last office visit with prescribing clinician: 3/25/2022   Last telemedicine visit with prescribing clinician: 5/5/2023   Next office visit with prescribing clinician: 5/5/2023     Additional details provided by patient: PATIENT ATTEMPTED TO SCHEDULE HER PHYSICAL AT EXACTLY A YEAR BUT DR. REDMOND WAS BOOKED UNTIL 05/05 PLEASE REFILL MEDS UNTIL THEN    Does the patient have less than a 3 day supply:  [x] Yes  [] No    Would you like a call back once the refill request has been completed: [x] Yes [] No    If the office needs to give you a call back, can they leave a voicemail: [x] Yes [] No    Chio Navarrete Rep   03/24/23 16:09 EDT

## 2023-05-09 ENCOUNTER — TELEPHONE (OUTPATIENT)
Dept: FAMILY MEDICINE CLINIC | Facility: CLINIC | Age: 28
End: 2023-05-09

## 2023-05-09 RX ORDER — LEVETIRACETAM 500 MG/1
1000 TABLET, EXTENDED RELEASE ORAL NIGHTLY
Qty: 180 TABLET | Refills: 0 | Status: CANCELLED | OUTPATIENT
Start: 2023-05-09

## 2023-05-09 RX ORDER — TOPIRAMATE 50 MG/1
50 TABLET, FILM COATED ORAL NIGHTLY
Qty: 90 TABLET | Refills: 0 | Status: CANCELLED | OUTPATIENT
Start: 2023-05-09

## 2023-05-09 NOTE — TELEPHONE ENCOUNTER
Caller: Tangela Rodarte    Relationship: Self    Best call back number: [phone]  Requested Prescriptions:   Requested Prescriptions     Pending Prescriptions Disp Refills   • levETIRAcetam XR (KEPPRA XR) 500 MG 24 hr tablet 180 tablet 0     Sig: Take 2 tablets by mouth Every Night.   • topiramate (TOPAMAX) 50 MG tablet 90 tablet 0     Sig: Take 1 tablet by mouth Every Night.        [facility] sent[id number] [place] [address] [address]  AT [address] [phone] [facility] [phone]      Last office visit with prescribing clinician: 3/25/2022   Last telemedicine visit with prescribing clinician: 3/24/2023   Next office visit with prescribing clinician: 8/25/2023     Additional details provided by patient:   Does the patient have less than a 3 day supply:  [] Yes  [] No    Would you like a call back once the refill request has been completed: [] Yes [] No    If the office needs to give you a call back, can they leave a voicemail: [] Yes [] No    Chio Tucker Rep   05/09/23 12:09 EDT

## 2023-05-10 RX ORDER — TOPIRAMATE 50 MG/1
50 TABLET, FILM COATED ORAL NIGHTLY
Qty: 90 TABLET | Refills: 0 | OUTPATIENT
Start: 2023-05-10

## 2023-05-10 RX ORDER — TOPIRAMATE 50 MG/1
50 TABLET, FILM COATED ORAL NIGHTLY
Qty: 30 TABLET | Refills: 0 | Status: SHIPPED | OUTPATIENT
Start: 2023-05-10

## 2023-05-10 RX ORDER — LEVETIRACETAM 500 MG/1
1000 TABLET, EXTENDED RELEASE ORAL NIGHTLY
Qty: 180 TABLET | Refills: 0 | OUTPATIENT
Start: 2023-05-10

## 2023-05-10 RX ORDER — LEVETIRACETAM 500 MG/1
1000 TABLET, EXTENDED RELEASE ORAL NIGHTLY
Qty: 60 TABLET | Refills: 0 | Status: SHIPPED | OUTPATIENT
Start: 2023-05-10

## 2023-05-10 NOTE — TELEPHONE ENCOUNTER
Rx Refill Note  Requested Prescriptions     Pending Prescriptions Disp Refills   • levETIRAcetam XR (KEPPRA XR) 500 MG 24 hr tablet [Pharmacy Med Name: LEVETIRACETAM ER 500MG TABLETS] 180 tablet 0     Sig: TAKE 2 TABLETS BY MOUTH EVERY NIGHT   • topiramate (TOPAMAX) 50 MG tablet [Pharmacy Med Name: TOPIRAMATE 50MG TABLETS] 90 tablet 0     Sig: TAKE 1 TABLET BY MOUTH EVERY NIGHT      Last office visit with prescribing clinician: 3/25/2022   Last telemedicine visit with prescribing clinician: 5/9/2023   Next office visit with prescribing clinician: 8/25/2023                       Lipid Panel With LDL / HDL Ratio (08/18/2020 08:21)    Would you like a call back once the refill request has been completed: [] Yes [] No    If the office needs to give you a call back, can they leave a voicemail: [] Yes [] No    Gunjan Cavanaugh, RT  05/10/23, 13:23 EDT

## 2023-05-10 NOTE — TELEPHONE ENCOUNTER
Caller: Tangela Rodarte    Relationship: Self    Best call back number: [phone]     What is the best time to reach you: ANYTIME     Who are you requesting to speak with (clinical staff, provider,  specific staff member): CLINICAL     Do you know the name of the person who called: TANGELA    What was the call regarding: TANGELA SAYS THE OFFICE RESCHEDULED HER 5/5/2023 APPOINTMENT UNTIL 8/2023, SHE SAYS THE PHARMACY NOTIFIED HER THAT HER MEDICATION REFILL WAS DENIED. SHE WOULD LIKE A CALL BACK     Do you require a callback: YES

## 2023-05-10 NOTE — TELEPHONE ENCOUNTER
Rescheduled to 6/2/2023 (patient gets new insurance effective 6/1/2023 so wanted to wait until it was active).  Request refill to get her through until appt.

## 2023-06-05 ENCOUNTER — CLINICAL SUPPORT (OUTPATIENT)
Dept: FAMILY MEDICINE CLINIC | Facility: CLINIC | Age: 28
End: 2023-06-05
Payer: COMMERCIAL

## 2023-06-05 DIAGNOSIS — Z13.220 ENCOUNTER FOR SCREENING FOR LIPID DISORDER: ICD-10-CM

## 2023-06-05 PROCEDURE — 36415 COLL VENOUS BLD VENIPUNCTURE: CPT | Performed by: FAMILY MEDICINE

## 2023-06-05 PROCEDURE — 80061 LIPID PANEL: CPT | Performed by: FAMILY MEDICINE

## 2023-06-05 NOTE — PROGRESS NOTES
Venipuncture performed in L ARM by RT Mary  with good hemostasis. Patient tolerated well. 06/05/23 Gunjan Cavanaugh, RT

## 2023-06-06 LAB
CHOLEST SERPL-MCNC: 173 MG/DL (ref 0–200)
HDLC SERPL-MCNC: 44 MG/DL (ref 40–60)
LDLC SERPL CALC-MCNC: 101 MG/DL (ref 0–100)
LDLC/HDLC SERPL: 2.19 {RATIO}
TRIGL SERPL-MCNC: 163 MG/DL (ref 0–150)
VLDLC SERPL-MCNC: 28 MG/DL (ref 5–40)

## 2024-01-03 RX ORDER — TOPIRAMATE 50 MG/1
50 TABLET, FILM COATED ORAL NIGHTLY
Qty: 90 TABLET | Refills: 0 | Status: SHIPPED | OUTPATIENT
Start: 2024-01-03

## 2024-01-03 NOTE — TELEPHONE ENCOUNTER
Rx Refill Note  Requested Prescriptions     Pending Prescriptions Disp Refills    topiramate (TOPAMAX) 50 MG tablet [Pharmacy Med Name: TOPIRAMATE 50MG TABLETS] 90 tablet 2     Sig: TAKE 1 TABLET BY MOUTH EVERY NIGHT      Last office visit with prescribing clinician: 6/2/2023   Last telemedicine visit with prescribing clinician: Visit date not found   Next office visit with prescribing clinician: Visit date not found        Lipid Panel (06/05/2023 08:32)                  Would you like a call back once the refill request has been completed: [] Yes [] No    If the office needs to give you a call back, can they leave a voicemail: [] Yes [] No    Gunjan Cavanaugh, RT  01/03/24, 08:36 EST

## 2024-03-04 RX ORDER — LEVETIRACETAM 500 MG/1
1000 TABLET, FILM COATED, EXTENDED RELEASE ORAL NIGHTLY
Qty: 180 TABLET | Refills: 0 | Status: SHIPPED | OUTPATIENT
Start: 2024-03-04

## 2024-03-04 NOTE — TELEPHONE ENCOUNTER
Caller: Tangela Rodarte    Relationship: Self    Best call back number: [phone]     Requested Prescriptions:   Requested Prescriptions     Pending Prescriptions Disp Refills    levETIRAcetam XR (KEPPRA XR) 500 MG tablet 180 tablet 2     Sig: Take 2 tablets by mouth Every Night.        Pharmacy where request should be sent: [facility] #[id number] [address] AT [address] [phone] [facility] [phone]      Last office visit with prescribing clinician: 6/2/2023   Last telemedicine visit with prescribing clinician: Visit date not found   Next office visit with prescribing clinician: 6/6/2024     Additional details provided by patient: PATIENT HAS ABOUT 2 WEEKS LEFT OF MEDICATION    PATIENT WOULD LIKE TO GET ENOUGH REFILLS TO LAST UNTIL HER NEXT APPOINTMENT IN JUNE     Does the patient have less than a 3 day supply:  [] Yes  [x] No    Would you like a call back once the refill request has been completed: [] Yes [x] No    If the office needs to give you a call back, can they leave a voicemail: [] Yes [x] No    Chio Sanchez Rep   03/04/24 12:13 EST

## 2024-06-06 ENCOUNTER — OFFICE VISIT (OUTPATIENT)
Dept: FAMILY MEDICINE CLINIC | Facility: CLINIC | Age: 29
End: 2024-06-06
Payer: COMMERCIAL

## 2024-06-06 VITALS
HEART RATE: 70 BPM | DIASTOLIC BLOOD PRESSURE: 82 MMHG | BODY MASS INDEX: 45.08 KG/M2 | OXYGEN SATURATION: 100 % | WEIGHT: 245 LBS | SYSTOLIC BLOOD PRESSURE: 118 MMHG | HEIGHT: 62 IN | TEMPERATURE: 98 F

## 2024-06-06 DIAGNOSIS — R56.9 SEIZURE: ICD-10-CM

## 2024-06-06 DIAGNOSIS — L40.9 PSORIASIS: ICD-10-CM

## 2024-06-06 DIAGNOSIS — Z00.00 ANNUAL PHYSICAL EXAM: Primary | ICD-10-CM

## 2024-06-06 DIAGNOSIS — E61.1 IRON DEFICIENCY: ICD-10-CM

## 2024-06-06 LAB
BILIRUB BLD-MCNC: NEGATIVE MG/DL
CLARITY, POC: CLEAR
COLOR UR: YELLOW
EXPIRATION DATE: NORMAL
GLUCOSE UR STRIP-MCNC: NEGATIVE MG/DL
KETONES UR QL: NEGATIVE
LEUKOCYTE EST, POC: NEGATIVE
Lab: NORMAL
NITRITE UR-MCNC: NEGATIVE MG/ML
PH UR: 7 [PH] (ref 5–8)
PROT UR STRIP-MCNC: NEGATIVE MG/DL
RBC # UR STRIP: NEGATIVE /UL
SP GR UR: 1.02 (ref 1–1.03)
UROBILINOGEN UR QL: NORMAL

## 2024-06-06 PROCEDURE — 36415 COLL VENOUS BLD VENIPUNCTURE: CPT | Performed by: FAMILY MEDICINE

## 2024-06-06 PROCEDURE — 99395 PREV VISIT EST AGE 18-39: CPT | Performed by: FAMILY MEDICINE

## 2024-06-06 PROCEDURE — 82728 ASSAY OF FERRITIN: CPT | Performed by: FAMILY MEDICINE

## 2024-06-06 PROCEDURE — 84466 ASSAY OF TRANSFERRIN: CPT | Performed by: FAMILY MEDICINE

## 2024-06-06 PROCEDURE — 83540 ASSAY OF IRON: CPT | Performed by: FAMILY MEDICINE

## 2024-06-06 PROCEDURE — 80061 LIPID PANEL: CPT | Performed by: FAMILY MEDICINE

## 2024-06-06 PROCEDURE — 81003 URINALYSIS AUTO W/O SCOPE: CPT | Performed by: FAMILY MEDICINE

## 2024-06-06 RX ORDER — LEVETIRACETAM 500 MG/1
1000 TABLET, FILM COATED, EXTENDED RELEASE ORAL NIGHTLY
Qty: 180 TABLET | Refills: 2 | Status: SHIPPED | OUTPATIENT
Start: 2024-06-06

## 2024-06-06 RX ORDER — TOPIRAMATE 50 MG/1
50 TABLET, FILM COATED ORAL NIGHTLY
Qty: 90 TABLET | Refills: 2 | Status: SHIPPED | OUTPATIENT
Start: 2024-06-06

## 2024-06-06 NOTE — PROGRESS NOTES
Venipuncture performed in right arm by Enedina Castillo CMA  with good hemostasis. Patient tolerated well. 06/06/24 Enedina Castillo CMA

## 2024-06-07 LAB
CHOLEST SERPL-MCNC: 187 MG/DL (ref 0–200)
FERRITIN SERPL-MCNC: 59.7 NG/ML (ref 13–150)
HDLC SERPL-MCNC: 46 MG/DL (ref 40–60)
IRON 24H UR-MRATE: 90 MCG/DL (ref 37–145)
IRON SATN MFR SERPL: 19 % (ref 20–50)
LDLC SERPL CALC-MCNC: 116 MG/DL (ref 0–100)
LDLC/HDLC SERPL: 2.44 {RATIO}
TIBC SERPL-MCNC: 483 MCG/DL (ref 298–536)
TRANSFERRIN SERPL-MCNC: 324 MG/DL (ref 200–360)
TRIGL SERPL-MCNC: 143 MG/DL (ref 0–150)
VLDLC SERPL-MCNC: 25 MG/DL (ref 5–40)

## 2024-06-12 ENCOUNTER — TELEPHONE (OUTPATIENT)
Dept: FAMILY MEDICINE CLINIC | Facility: CLINIC | Age: 29
End: 2024-06-12

## 2024-06-12 NOTE — TELEPHONE ENCOUNTER
Caller: Tangela Rodarte    Relationship: Self    Best call back number: [phone]     Which medication are you concerned about: levETIRAcetam XR (KEPPRA XR)   topiramate (TOPAMAX)     Who prescribed you this medication: STROOT    When did you start taking this medication: YEARS    What are your concerns: PATIENT STATES SHE WAS TO GET A YEAR'S REFILLS ON MEDICINE BUT SCRIPT ONLY GAVE HER 9 MONTHS TOTAL. PLEASE CALL TO DISCUSS      
RELAY.    Left message. Dr Up states she does this to give time to schedule annual appt when calling in for last refill.  
oral

## 2024-06-17 ENCOUNTER — PATIENT MESSAGE (OUTPATIENT)
Dept: FAMILY MEDICINE CLINIC | Facility: CLINIC | Age: 29
End: 2024-06-17
Payer: COMMERCIAL

## 2024-06-20 ENCOUNTER — TELEPHONE (OUTPATIENT)
Dept: FAMILY MEDICINE CLINIC | Facility: CLINIC | Age: 29
End: 2024-06-20
Payer: COMMERCIAL

## 2024-06-20 NOTE — TELEPHONE ENCOUNTER
Amna Lara, DO       Iron labs w/ borderline iron saturation------work on eating iron rich foods  LIPIDS ------some elevated; work on cardio exercise and lean meats in diet     LVM informing pt

## 2025-02-19 RX ORDER — LEVETIRACETAM 500 MG/1
1000 TABLET, FILM COATED, EXTENDED RELEASE ORAL NIGHTLY
Qty: 180 TABLET | Refills: 0 | Status: SHIPPED | OUTPATIENT
Start: 2025-02-19

## 2025-02-19 RX ORDER — TOPIRAMATE 50 MG/1
50 TABLET, FILM COATED ORAL NIGHTLY
Qty: 90 TABLET | Refills: 0 | Status: SHIPPED | OUTPATIENT
Start: 2025-02-19

## 2025-02-19 NOTE — TELEPHONE ENCOUNTER
Caller: Tangela Rodarte    Relationship: Self    Best call back number: [phone]    Requested Prescriptions:   Requested Prescriptions     Pending Prescriptions Disp Refills    topiramate (TOPAMAX) 50 MG tablet 90 tablet 2     Sig: Take 1 tablet by mouth Every Night.    levETIRAcetam XR (KEPPRA XR) 500 MG tablet 180 tablet 2     Sig: Take 2 tablets by mouth Every Night.        Pharmacy where request should be sent: [facility] #[id number] [place] [address] [address] AT [address] [phone] [facility] [phone]      Last office visit with prescribing clinician: 6/6/2024   Last telemedicine visit with prescribing clinician: Visit date not found   Next office visit with prescribing clinician: 6/9/2025     Does the patient have less than a 3 day supply:  [] Yes  [x] No    Chio Malik Rep   02/19/25 11:17 EST

## 2025-02-19 NOTE — TELEPHONE ENCOUNTER
Rx Refill Note  Requested Prescriptions     Pending Prescriptions Disp Refills    topiramate (TOPAMAX) 50 MG tablet 90 tablet 2     Sig: Take 1 tablet by mouth Every Night.    levETIRAcetam XR (KEPPRA XR) 500 MG tablet 180 tablet 2     Sig: Take 2 tablets by mouth Every Night.      Last office visit with prescribing clinician: 6/6/2024   Last telemedicine visit with prescribing clinician: Visit date not found   Next office visit with prescribing clinician: 6/9/2025        Lipid Panel (06/06/2024 09:40)                  Would you like a call back once the refill request has been completed: [] Yes [] No    If the office needs to give you a call back, can they leave a voicemail: [] Yes [] No    Gunjan Cavanaugh, RT  02/19/25, 11:28 EST

## 2025-05-22 NOTE — TELEPHONE ENCOUNTER
Rx Refill Note  Requested Prescriptions     Pending Prescriptions Disp Refills    topiramate (TOPAMAX) 50 MG tablet [Pharmacy Med Name: TOPIRAMATE 50MG TABLETS] 90 tablet 0     Sig: TAKE 1 TABLET BY MOUTH EVERY NIGHT    levETIRAcetam XR (KEPPRA XR) 500 MG tablet [Pharmacy Med Name: LEVETIRACETAM ER 500MG TABLETS] 180 tablet 0     Sig: TAKE 2 TABLETS BY MOUTH EVERY NIGHT      Last office visit with prescribing clinician: 6/6/2024   Last telemedicine visit with prescribing clinician: Visit date not found   Next office visit with prescribing clinician: Visit date not found        Lipid Panel (06/06/2024 09:40)                  Would you like a call back once the refill request has been completed: [] Yes [] No    If the office needs to give you a call back, can they leave a voicemail: [] Yes [] No    Gunjan Cavanaugh, RT  05/22/25, 13:01 EDT

## 2025-05-23 RX ORDER — LEVETIRACETAM 500 MG/1
1000 TABLET, FILM COATED, EXTENDED RELEASE ORAL NIGHTLY
Qty: 60 TABLET | Refills: 0 | OUTPATIENT
Start: 2025-05-23

## 2025-05-23 RX ORDER — TOPIRAMATE 50 MG/1
50 TABLET, FILM COATED ORAL NIGHTLY
Qty: 30 TABLET | Refills: 0 | OUTPATIENT
Start: 2025-05-23

## 2025-05-27 NOTE — TELEPHONE ENCOUNTER
RELAY    Patient needs an appointment per Dr. Up.    Left msg for patient to call back to schedule.